# Patient Record
Sex: MALE | Race: WHITE | NOT HISPANIC OR LATINO | Employment: OTHER | URBAN - METROPOLITAN AREA
[De-identification: names, ages, dates, MRNs, and addresses within clinical notes are randomized per-mention and may not be internally consistent; named-entity substitution may affect disease eponyms.]

---

## 2018-05-22 ENCOUNTER — HOSPITAL ENCOUNTER (EMERGENCY)
Facility: HOSPITAL | Age: 68
Discharge: HOME | End: 2018-05-22
Attending: EMERGENCY MEDICINE | Admitting: EMERGENCY MEDICINE

## 2018-05-22 ENCOUNTER — APPOINTMENT (OUTPATIENT)
Dept: GENERAL RADIOLOGY | Facility: HOSPITAL | Age: 68
End: 2018-05-22

## 2018-05-22 VITALS
HEART RATE: 87 BPM | HEIGHT: 71 IN | SYSTOLIC BLOOD PRESSURE: 157 MMHG | OXYGEN SATURATION: 96 % | BODY MASS INDEX: 34.58 KG/M2 | TEMPERATURE: 98.1 F | WEIGHT: 247 LBS | DIASTOLIC BLOOD PRESSURE: 100 MMHG | RESPIRATION RATE: 18 BRPM

## 2018-05-22 DIAGNOSIS — J44.1 COPD WITH ACUTE EXACERBATION (HCC): ICD-10-CM

## 2018-05-22 DIAGNOSIS — J20.9 BRONCHITIS, ACUTE, WITH BRONCHOSPASM: Primary | ICD-10-CM

## 2018-05-22 LAB
ALBUMIN SERPL-MCNC: 4.4 G/DL (ref 3.2–4.8)
ALBUMIN/GLOB SERPL: 1.7 G/DL (ref 1.5–2.5)
ALP SERPL-CCNC: 88 U/L (ref 25–100)
ALT SERPL W P-5'-P-CCNC: 20 U/L (ref 7–40)
ANION GAP SERPL CALCULATED.3IONS-SCNC: 3 MMOL/L (ref 3–11)
AST SERPL-CCNC: 13 U/L (ref 0–33)
BASOPHILS # BLD AUTO: 0.02 10*3/MM3 (ref 0–0.2)
BASOPHILS NFR BLD AUTO: 0.2 % (ref 0–1)
BILIRUB SERPL-MCNC: 0.5 MG/DL (ref 0.3–1.2)
BNP SERPL-MCNC: 31 PG/ML (ref 0–100)
BUN BLD-MCNC: 13 MG/DL (ref 9–23)
BUN/CREAT SERPL: 11.8 (ref 7–25)
CALCIUM SPEC-SCNC: 9.3 MG/DL (ref 8.7–10.4)
CHLORIDE SERPL-SCNC: 108 MMOL/L (ref 99–109)
CO2 SERPL-SCNC: 30 MMOL/L (ref 20–31)
CREAT BLD-MCNC: 1.1 MG/DL (ref 0.6–1.3)
D-LACTATE SERPL-SCNC: 1.2 MMOL/L (ref 0.5–2)
DEPRECATED RDW RBC AUTO: 51.3 FL (ref 37–54)
EOSINOPHIL # BLD AUTO: 0.27 10*3/MM3 (ref 0–0.3)
EOSINOPHIL NFR BLD AUTO: 2.3 % (ref 0–3)
ERYTHROCYTE [DISTWIDTH] IN BLOOD BY AUTOMATED COUNT: 15.2 % (ref 11.3–14.5)
GFR SERPL CREATININE-BSD FRML MDRD: 67 ML/MIN/1.73
GLOBULIN UR ELPH-MCNC: 2.6 GM/DL
GLUCOSE BLD-MCNC: 110 MG/DL (ref 70–100)
HCT VFR BLD AUTO: 47.6 % (ref 38.9–50.9)
HGB BLD-MCNC: 15.7 G/DL (ref 13.1–17.5)
HOLD SPECIMEN: NORMAL
HOLD SPECIMEN: NORMAL
IMM GRANULOCYTES # BLD: 0.05 10*3/MM3 (ref 0–0.03)
IMM GRANULOCYTES NFR BLD: 0.4 % (ref 0–0.6)
LYMPHOCYTES # BLD AUTO: 1.68 10*3/MM3 (ref 0.6–4.8)
LYMPHOCYTES NFR BLD AUTO: 14.5 % (ref 24–44)
MCH RBC QN AUTO: 30.4 PG (ref 27–31)
MCHC RBC AUTO-ENTMCNC: 33 G/DL (ref 32–36)
MCV RBC AUTO: 92.2 FL (ref 80–99)
MONOCYTES # BLD AUTO: 1.23 10*3/MM3 (ref 0–1)
MONOCYTES NFR BLD AUTO: 10.6 % (ref 0–12)
NEUTROPHILS # BLD AUTO: 8.39 10*3/MM3 (ref 1.5–8.3)
NEUTROPHILS NFR BLD AUTO: 72.4 % (ref 41–71)
PLATELET # BLD AUTO: 289 10*3/MM3 (ref 150–450)
PMV BLD AUTO: 11.1 FL (ref 6–12)
POTASSIUM BLD-SCNC: 4.4 MMOL/L (ref 3.5–5.5)
PROT SERPL-MCNC: 7 G/DL (ref 5.7–8.2)
RBC # BLD AUTO: 5.16 10*6/MM3 (ref 4.2–5.76)
SODIUM BLD-SCNC: 141 MMOL/L (ref 132–146)
TROPONIN I SERPL-MCNC: 0 NG/ML (ref 0–0.07)
TROPONIN I SERPL-MCNC: 0 NG/ML (ref 0–0.07)
WBC NRBC COR # BLD: 11.59 10*3/MM3 (ref 3.5–10.8)
WHOLE BLOOD HOLD SPECIMEN: NORMAL
WHOLE BLOOD HOLD SPECIMEN: NORMAL

## 2018-05-22 PROCEDURE — 80053 COMPREHEN METABOLIC PANEL: CPT | Performed by: EMERGENCY MEDICINE

## 2018-05-22 PROCEDURE — 83880 ASSAY OF NATRIURETIC PEPTIDE: CPT | Performed by: EMERGENCY MEDICINE

## 2018-05-22 PROCEDURE — 93005 ELECTROCARDIOGRAM TRACING: CPT | Performed by: EMERGENCY MEDICINE

## 2018-05-22 PROCEDURE — 84484 ASSAY OF TROPONIN QUANT: CPT

## 2018-05-22 PROCEDURE — 96374 THER/PROPH/DIAG INJ IV PUSH: CPT

## 2018-05-22 PROCEDURE — 87040 BLOOD CULTURE FOR BACTERIA: CPT | Performed by: NURSE PRACTITIONER

## 2018-05-22 PROCEDURE — 85025 COMPLETE CBC W/AUTO DIFF WBC: CPT | Performed by: EMERGENCY MEDICINE

## 2018-05-22 PROCEDURE — 94640 AIRWAY INHALATION TREATMENT: CPT

## 2018-05-22 PROCEDURE — 83605 ASSAY OF LACTIC ACID: CPT | Performed by: NURSE PRACTITIONER

## 2018-05-22 PROCEDURE — 96361 HYDRATE IV INFUSION ADD-ON: CPT

## 2018-05-22 PROCEDURE — 36415 COLL VENOUS BLD VENIPUNCTURE: CPT

## 2018-05-22 PROCEDURE — 25010000002 METHYLPREDNISOLONE PER 125 MG: Performed by: NURSE PRACTITIONER

## 2018-05-22 PROCEDURE — 71045 X-RAY EXAM CHEST 1 VIEW: CPT

## 2018-05-22 PROCEDURE — 99284 EMERGENCY DEPT VISIT MOD MDM: CPT

## 2018-05-22 RX ORDER — CEFDINIR 300 MG/1
300 CAPSULE ORAL 2 TIMES DAILY
Qty: 14 CAPSULE | Refills: 0 | Status: SHIPPED | OUTPATIENT
Start: 2018-05-22 | End: 2020-12-03

## 2018-05-22 RX ORDER — ALBUTEROL SULFATE 1.25 MG/3ML
1 SOLUTION RESPIRATORY (INHALATION) EVERY 6 HOURS PRN
Qty: 50 VIAL | Refills: 2 | Status: SHIPPED | OUTPATIENT
Start: 2018-05-22 | End: 2020-12-03 | Stop reason: SDUPTHER

## 2018-05-22 RX ORDER — METHYLPREDNISOLONE SODIUM SUCCINATE 125 MG/2ML
125 INJECTION, POWDER, LYOPHILIZED, FOR SOLUTION INTRAMUSCULAR; INTRAVENOUS ONCE
Status: COMPLETED | OUTPATIENT
Start: 2018-05-22 | End: 2018-05-22

## 2018-05-22 RX ORDER — AZITHROMYCIN 250 MG/1
TABLET, FILM COATED ORAL
Qty: 6 TABLET | Refills: 0 | Status: SHIPPED | OUTPATIENT
Start: 2018-05-22 | End: 2020-12-03

## 2018-05-22 RX ORDER — SODIUM CHLORIDE 0.9 % (FLUSH) 0.9 %
10 SYRINGE (ML) INJECTION AS NEEDED
Status: DISCONTINUED | OUTPATIENT
Start: 2018-05-22 | End: 2018-05-22 | Stop reason: HOSPADM

## 2018-05-22 RX ORDER — METHYLPREDNISOLONE 4 MG/1
TABLET ORAL
Qty: 21 TABLET | Refills: 0 | Status: SHIPPED | OUTPATIENT
Start: 2018-05-22 | End: 2020-12-03

## 2018-05-22 RX ORDER — IPRATROPIUM BROMIDE AND ALBUTEROL SULFATE 2.5; .5 MG/3ML; MG/3ML
3 SOLUTION RESPIRATORY (INHALATION) ONCE
Status: COMPLETED | OUTPATIENT
Start: 2018-05-22 | End: 2018-05-22

## 2018-05-22 RX ADMIN — METHYLPREDNISOLONE SODIUM SUCCINATE 125 MG: 125 INJECTION, POWDER, FOR SOLUTION INTRAMUSCULAR; INTRAVENOUS at 16:49

## 2018-05-22 RX ADMIN — IPRATROPIUM BROMIDE AND ALBUTEROL SULFATE 3 ML: 2.5; .5 SOLUTION RESPIRATORY (INHALATION) at 16:24

## 2018-05-22 RX ADMIN — SODIUM CHLORIDE 1000 ML: 9 INJECTION, SOLUTION INTRAVENOUS at 16:48

## 2018-05-22 NOTE — ED PROVIDER NOTES
"Subjective   [facility] for SOB getting grad worse for five days, producing clear sputum with occcasional chest pain, the last occasion of pain was this morning.  Denies known fever.  Was hospitalized in Fall 2017 in [place] for \"lung infection.\"  Not using oxygen at home.          History provided by:  Patient   used: No    Shortness of Breath   Severity:  Moderate  Onset quality:  Gradual  Duration:  5 days  Timing:  Constant  Progression:  Worsening  Chronicity:  New  Context: URI    Relieved by:  Nothing  Worsened by:  Nothing  Ineffective treatments:  None tried  Associated symptoms: chest pain and cough    Associated symptoms: no abdominal pain, no diaphoresis, no fever, no headaches, no neck pain, no rash, no sore throat, no vomiting and no wheezing    Risk factors: obesity and tobacco use        Review of Systems   Constitutional: Positive for appetite change and fatigue. Negative for diaphoresis and fever.   HENT: Negative for sore throat.         Mentions a right upper toothache.    Eyes: Positive for discharge (both eyes with drainage \"long time\" ). Negative for pain and visual disturbance.   Respiratory: Positive for cough and shortness of breath (with exertion ). Negative for wheezing and stridor.    Cardiovascular: Positive for chest pain. Negative for leg swelling.   Gastrointestinal: Negative.  Negative for abdominal pain, diarrhea, nausea and vomiting.   Endocrine: Negative.         No hx DM     Genitourinary: Negative.  Negative for dysuria.   Musculoskeletal: Negative.  Negative for back pain and neck pain.   Skin: Negative.  Negative for pallor and rash.   Allergic/Immunologic: Negative.    Neurological: Negative.  Negative for syncope and headaches.   Hematological: Negative.    Psychiatric/Behavioral: Negative.  Negative for agitation.       Past Medical History:   Diagnosis Date   • COPD (chronic obstructive pulmonary disease)  " "  • Hypertension        Allergies   Allergen Reactions   • Other Unknown (See Comments)     UNKNOWN \"QUIT SMOKING PATCH\".  AND \"IT SET ME ON FIRE\"       History reviewed. No pertinent surgical history.    History reviewed. No pertinent family history.    Social History     Social History   • Marital status:      Social History Main Topics   • Smoking status: Current Every Day Smoker     Packs/day: 1.00     Types: Cigarettes   • Alcohol use Yes      Comment: SOCIAL   • Drug use: No   • Sexual activity: Defer     Other Topics Concern   • Not on file           Objective   Physical Exam   Constitutional: He is oriented to person, place, and time. He appears well-developed and well-nourished. No distress.   HENT:   Head: Normocephalic.   Mouth/Throat: Oropharynx is clear and moist.   Eyes: EOM are normal. Pupils are equal, round, and reactive to light. Right eye exhibits no discharge. Left eye exhibits no discharge.   Neck: Normal range of motion. Neck supple.   Cardiovascular: Normal rate and regular rhythm.    Pulmonary/Chest: Effort normal. No respiratory distress. He has wheezes. He has rales. He exhibits no tenderness.   Abdominal: Soft. Bowel sounds are normal. He exhibits no distension. There is no tenderness. There is no rebound and no guarding.   Musculoskeletal: Normal range of motion. He exhibits no edema.   Neurological: He is alert and oriented to person, place, and time. No sensory deficit. Coordination normal.   Skin: Skin is warm and dry. Capillary refill takes less than 2 seconds. No rash noted. He is not diaphoretic. No erythema. No pallor.   Psychiatric: He has a normal mood and affect. Thought content normal.   Very poor insight into his health.    Nursing note and vitals reviewed.      Procedures           ED Course      Recent Results (from the past 24 hour(s))   Comprehensive Metabolic Panel    Collection Time: 05/22/18  3:44 PM   Result Value Ref Range    Glucose 110 (H) 70 - 100 mg/dL    " BUN 13 9 - 23 mg/dL    Creatinine 1.10 0.60 - 1.30 mg/dL    Sodium 141 132 - 146 mmol/L    Potassium 4.4 3.5 - 5.5 mmol/L    Chloride 108 99 - 109 mmol/L    CO2 30.0 20.0 - 31.0 mmol/L    Calcium 9.3 8.7 - 10.4 mg/dL    Total Protein 7.0 5.7 - 8.2 g/dL    Albumin 4.40 3.20 - 4.80 g/dL    ALT (SGPT) 20 7 - 40 U/L    AST (SGOT) 13 0 - 33 U/L    Alkaline Phosphatase 88 25 - 100 U/L    Total Bilirubin 0.5 0.3 - 1.2 mg/dL    eGFR Non African Amer 67 >60 mL/min/1.73    Globulin 2.6 gm/dL    A/G Ratio 1.7 1.5 - 2.5 g/dL    BUN/Creatinine Ratio 11.8 7.0 - 25.0    Anion Gap 3.0 3.0 - 11.0 mmol/L   BNP    Collection Time: 05/22/18  3:44 PM   Result Value Ref Range    BNP 31.0 0.0 - 100.0 pg/mL   Light Blue Top    Collection Time: 05/22/18  3:44 PM   Result Value Ref Range    Extra Tube hold for add-on    Green Top (Gel)    Collection Time: 05/22/18  3:44 PM   Result Value Ref Range    Extra Tube Hold for add-ons.    Lavender Top    Collection Time: 05/22/18  3:44 PM   Result Value Ref Range    Extra Tube hold for add-on    Gold Top - SST    Collection Time: 05/22/18  3:44 PM   Result Value Ref Range    Extra Tube Hold for add-ons.    CBC Auto Differential    Collection Time: 05/22/18  3:44 PM   Result Value Ref Range    WBC 11.59 (H) 3.50 - 10.80 10*3/mm3    RBC 5.16 4.20 - 5.76 10*6/mm3    Hemoglobin 15.7 13.1 - 17.5 g/dL    Hematocrit 47.6 38.9 - 50.9 %    MCV 92.2 80.0 - 99.0 fL    MCH 30.4 27.0 - 31.0 pg    MCHC 33.0 32.0 - 36.0 g/dL    RDW 15.2 (H) 11.3 - 14.5 %    RDW-SD 51.3 37.0 - 54.0 fl    MPV 11.1 6.0 - 12.0 fL    Platelets 289 150 - 450 10*3/mm3    Neutrophil % 72.4 (H) 41.0 - 71.0 %    Lymphocyte % 14.5 (L) 24.0 - 44.0 %    Monocyte % 10.6 0.0 - 12.0 %    Eosinophil % 2.3 0.0 - 3.0 %    Basophil % 0.2 0.0 - 1.0 %    Immature Grans % 0.4 0.0 - 0.6 %    Neutrophils, Absolute 8.39 (H) 1.50 - 8.30 10*3/mm3    Lymphocytes, Absolute 1.68 0.60 - 4.80 10*3/mm3    Monocytes, Absolute 1.23 (H) 0.00 - 1.00 10*3/mm3     Eosinophils, Absolute 0.27 0.00 - 0.30 10*3/mm3    Basophils, Absolute 0.02 0.00 - 0.20 10*3/mm3    Immature Grans, Absolute 0.05 (H) 0.00 - 0.03 10*3/mm3   POC Troponin, Rapid    Collection Time: 05/22/18  3:49 PM   Result Value Ref Range    Troponin I 0.00 0.00 - 0.07 ng/mL   Lactic Acid, Plasma    Collection Time: 05/22/18  5:30 PM   Result Value Ref Range    Lactate 1.2 0.5 - 2.0 mmol/L   POC Troponin, Rapid    Collection Time: 05/22/18  6:05 PM   Result Value Ref Range    Troponin I 0.00 0.00 - 0.07 ng/mL     Note: In addition to lab results from this visit, the labs listed above may include labs taken at another facility or during a different encounter within the last 24 hours. Please correlate lab times with ED admission and discharge times for further clarification of the services performed during this visit.    XR Chest 1 View   Final Result   Bibasilar opacifications and probable trace bilateral   pleural effusions concerning for atelectasis and/or early airspace   disease.       D:  05/22/2018   E:  05/22/2018       This report was finalized on 5/22/2018 5:16 PM by Dr. Dilip Wilkerson.            Vitals:    05/22/18 1700 05/22/18 1730 05/22/18 1810 05/22/18 1811   BP: 140/91 141/100 (!) 144/107    Pulse: 81 82  91   Resp:       Temp:       TempSrc:       SpO2: 96% 97%  97%   Weight:       Height:         Medications   sodium chloride 0.9 % flush 10 mL (not administered)   sodium chloride 0.9 % bolus 1,000 mL (1,000 mL Intravenous New Bag 5/22/18 1648)   ipratropium-albuterol (DUO-NEB) nebulizer solution 3 mL (3 mL Nebulization Given 5/22/18 1624)   methylPREDNISolone sodium succinate (SOLU-Medrol) injection 125 mg (125 mg Intravenous Given 5/22/18 1649)     ECG/EMG Results (last 24 hours)     Procedure Component Value Units Date/Time    ECG 12 Lead [[id number]] Collected:  05/22/18 1539     Updated:  05/22/18 1731    ECG 12 Lead [[id number]] Collected:  05/22/18 1759     Updated:  05/22/18 1800                     [facility]      Final diagnoses:   Bronchitis, acute, with bronchospasm   COPD with acute exacerbation            Jo-Ann Marino, APRN  05/22/18 2588

## 2018-05-22 NOTE — DISCHARGE INSTRUCTIONS
MEDICATIONS AS DIRECTED.  FOLLOW UP WITH YOUR PRIMARY CARE PROVIDER IN [facility] TO MONITOR YOUR RECOVERY.  THANK YOU

## 2018-05-27 LAB
BACTERIA SPEC AEROBE CULT: NORMAL
BACTERIA SPEC AEROBE CULT: NORMAL

## 2020-12-03 ENCOUNTER — CONSULT (OUTPATIENT)
Dept: CARDIOLOGY | Facility: CLINIC | Age: 70
End: 2020-12-03

## 2020-12-03 VITALS
BODY MASS INDEX: 32.98 KG/M2 | WEIGHT: 235.6 LBS | SYSTOLIC BLOOD PRESSURE: 131 MMHG | TEMPERATURE: 98 F | HEART RATE: 67 BPM | DIASTOLIC BLOOD PRESSURE: 68 MMHG | OXYGEN SATURATION: 97 % | HEIGHT: 71 IN

## 2020-12-03 DIAGNOSIS — R06.02 SHORTNESS OF BREATH ON EXERTION: Primary | ICD-10-CM

## 2020-12-03 DIAGNOSIS — E66.8 MODERATE OBESITY: ICD-10-CM

## 2020-12-03 DIAGNOSIS — I49.3 FREQUENT PVCS: ICD-10-CM

## 2020-12-03 DIAGNOSIS — I20.0 UNSTABLE ANGINA (HCC): ICD-10-CM

## 2020-12-03 DIAGNOSIS — Z87.891 PERSONAL HISTORY OF NICOTINE DEPENDENCE: ICD-10-CM

## 2020-12-03 DIAGNOSIS — Z72.0 CURRENT TOBACCO USE: ICD-10-CM

## 2020-12-03 PROBLEM — Z12.2 ENCOUNTER FOR SCREENING FOR LUNG CANCER: Status: RESOLVED | Noted: 2020-12-03 | Resolved: 2020-12-03

## 2020-12-03 PROBLEM — Z12.2 ENCOUNTER FOR SCREENING FOR LUNG CANCER: Status: ACTIVE | Noted: 2020-12-03

## 2020-12-03 PROCEDURE — 93000 ELECTROCARDIOGRAM COMPLETE: CPT | Performed by: INTERNAL MEDICINE

## 2020-12-03 PROCEDURE — 99204 OFFICE O/P NEW MOD 45 MIN: CPT | Performed by: INTERNAL MEDICINE

## 2020-12-03 RX ORDER — PANTOPRAZOLE SODIUM 40 MG/1
40 TABLET, DELAYED RELEASE ORAL DAILY
COMMUNITY

## 2020-12-03 RX ORDER — FLUTICASONE FUROATE 200 UG/1
POWDER RESPIRATORY (INHALATION) DAILY
COMMUNITY
Start: 2020-10-26 | End: 2022-05-24 | Stop reason: SDDI

## 2020-12-03 RX ORDER — GABAPENTIN 600 MG/1
TABLET ORAL 3 TIMES DAILY
COMMUNITY
Start: 2020-11-09 | End: 2022-05-24 | Stop reason: SDDI

## 2020-12-03 RX ORDER — IPRATROPIUM BROMIDE AND ALBUTEROL SULFATE 2.5; .5 MG/3ML; MG/3ML
1.5 SOLUTION RESPIRATORY (INHALATION) 4 TIMES DAILY
COMMUNITY
Start: 2020-11-09

## 2020-12-03 RX ORDER — ASPIRIN 325 MG
325 TABLET ORAL DAILY
COMMUNITY
End: 2022-05-24 | Stop reason: SDDI

## 2020-12-03 RX ORDER — NITROGLYCERIN 0.4 MG/1
TABLET SUBLINGUAL
Qty: 100 TABLET | Refills: 11 | Status: SHIPPED | OUTPATIENT
Start: 2020-12-03 | End: 2022-05-24 | Stop reason: SDDI

## 2020-12-03 RX ORDER — DIPHENHYDRAMINE HCL 25 MG
25 CAPSULE ORAL EVERY 6 HOURS PRN
COMMUNITY
End: 2022-05-24 | Stop reason: SDDI

## 2020-12-08 ENCOUNTER — APPOINTMENT (OUTPATIENT)
Dept: PREADMISSION TESTING | Facility: HOSPITAL | Age: 70
End: 2020-12-08

## 2020-12-08 PROCEDURE — C9803 HOPD COVID-19 SPEC COLLECT: HCPCS

## 2020-12-08 PROCEDURE — U0004 COV-19 TEST NON-CDC HGH THRU: HCPCS

## 2020-12-09 LAB — SARS-COV-2 RNA RESP QL NAA+PROBE: NOT DETECTED

## 2020-12-11 ENCOUNTER — HOSPITAL ENCOUNTER (OUTPATIENT)
Facility: HOSPITAL | Age: 70
Setting detail: HOSPITAL OUTPATIENT SURGERY
Discharge: HOME | End: 2020-12-11
Attending: INTERNAL MEDICINE | Admitting: INTERNAL MEDICINE

## 2020-12-11 VITALS
BODY MASS INDEX: 32.34 KG/M2 | TEMPERATURE: 97.8 F | HEIGHT: 71 IN | RESPIRATION RATE: 16 BRPM | DIASTOLIC BLOOD PRESSURE: 79 MMHG | OXYGEN SATURATION: 95 % | WEIGHT: 231 LBS | SYSTOLIC BLOOD PRESSURE: 133 MMHG | HEART RATE: 79 BPM

## 2020-12-11 DIAGNOSIS — Z72.0 CURRENT TOBACCO USE: ICD-10-CM

## 2020-12-11 DIAGNOSIS — I49.3 FREQUENT PVCS: ICD-10-CM

## 2020-12-11 DIAGNOSIS — R06.02 SHORTNESS OF BREATH ON EXERTION: ICD-10-CM

## 2020-12-11 DIAGNOSIS — I20.0 UNSTABLE ANGINA (HCC): ICD-10-CM

## 2020-12-11 LAB
ALBUMIN SERPL-MCNC: 4.6 G/DL (ref 3.5–5.2)
ALBUMIN/GLOB SERPL: 1.8 G/DL
ALP SERPL-CCNC: 106 U/L (ref 39–117)
ALT SERPL W P-5'-P-CCNC: 12 U/L (ref 1–41)
ANION GAP SERPL CALCULATED.3IONS-SCNC: 11 MMOL/L (ref 5–15)
AST SERPL-CCNC: 11 U/L (ref 1–40)
BILIRUB SERPL-MCNC: 0.6 MG/DL (ref 0–1.2)
BUN SERPL-MCNC: 11 MG/DL (ref 8–23)
BUN/CREAT SERPL: 10 (ref 7–25)
CALCIUM SPEC-SCNC: 9.9 MG/DL (ref 8.6–10.5)
CATH EF ESTIMATED: 50 %
CHLORIDE SERPL-SCNC: 102 MMOL/L (ref 98–107)
CHOLEST SERPL-MCNC: 215 MG/DL (ref 0–200)
CO2 SERPL-SCNC: 27 MMOL/L (ref 22–29)
CREAT SERPL-MCNC: 1.1 MG/DL (ref 0.76–1.27)
DEPRECATED RDW RBC AUTO: 49.1 FL (ref 37–54)
ERYTHROCYTE [DISTWIDTH] IN BLOOD BY AUTOMATED COUNT: 14.5 % (ref 12.3–15.4)
GFR SERPL CREATININE-BSD FRML MDRD: 66 ML/MIN/1.73
GLOBULIN UR ELPH-MCNC: 2.5 GM/DL
GLUCOSE SERPL-MCNC: 88 MG/DL (ref 65–99)
HBA1C MFR BLD: 5.6 % (ref 4.8–5.6)
HCT VFR BLD AUTO: 52.2 % (ref 37.5–51)
HDLC SERPL-MCNC: 42 MG/DL (ref 40–60)
HGB BLD-MCNC: 17 G/DL (ref 13–17.7)
LDLC SERPL CALC-MCNC: 147 MG/DL (ref 0–100)
LDLC/HDLC SERPL: 3.45 {RATIO}
MCH RBC QN AUTO: 29.9 PG (ref 26.6–33)
MCHC RBC AUTO-ENTMCNC: 32.6 G/DL (ref 31.5–35.7)
MCV RBC AUTO: 91.9 FL (ref 79–97)
PLATELET # BLD AUTO: 292 10*3/MM3 (ref 140–450)
PMV BLD AUTO: 10.6 FL (ref 6–12)
POTASSIUM SERPL-SCNC: 4.5 MMOL/L (ref 3.5–5.2)
PROT SERPL-MCNC: 7.1 G/DL (ref 6–8.5)
RBC # BLD AUTO: 5.68 10*6/MM3 (ref 4.14–5.8)
SODIUM SERPL-SCNC: 140 MMOL/L (ref 136–145)
TRIGL SERPL-MCNC: 141 MG/DL (ref 0–150)
VLDLC SERPL-MCNC: 26 MG/DL (ref 5–40)
WBC # BLD AUTO: 9.21 10*3/MM3 (ref 3.4–10.8)

## 2020-12-11 PROCEDURE — 80053 COMPREHEN METABOLIC PANEL: CPT | Performed by: NURSE PRACTITIONER

## 2020-12-11 PROCEDURE — 99153 MOD SED SAME PHYS/QHP EA: CPT

## 2020-12-11 PROCEDURE — 99152 MOD SED SAME PHYS/QHP 5/>YRS: CPT

## 2020-12-11 PROCEDURE — C1894 INTRO/SHEATH, NON-LASER: HCPCS | Performed by: INTERNAL MEDICINE

## 2020-12-11 PROCEDURE — 25010000002 MIDAZOLAM PER 1 MG: Performed by: INTERNAL MEDICINE

## 2020-12-11 PROCEDURE — 93458 L HRT ARTERY/VENTRICLE ANGIO: CPT | Performed by: INTERNAL MEDICINE

## 2020-12-11 PROCEDURE — 25010000002 FENTANYL CITRATE (PF) 100 MCG/2ML SOLUTION: Performed by: INTERNAL MEDICINE

## 2020-12-11 PROCEDURE — 83036 HEMOGLOBIN GLYCOSYLATED A1C: CPT | Performed by: NURSE PRACTITIONER

## 2020-12-11 PROCEDURE — 80061 LIPID PANEL: CPT | Performed by: NURSE PRACTITIONER

## 2020-12-11 PROCEDURE — C1769 GUIDE WIRE: HCPCS | Performed by: INTERNAL MEDICINE

## 2020-12-11 PROCEDURE — 0 IOPAMIDOL PER 1 ML: Performed by: INTERNAL MEDICINE

## 2020-12-11 PROCEDURE — 85027 COMPLETE CBC AUTOMATED: CPT | Performed by: NURSE PRACTITIONER

## 2020-12-11 RX ORDER — NITROGLYCERIN 0.4 MG/1
0.4 TABLET SUBLINGUAL
Status: DISCONTINUED | OUTPATIENT
Start: 2020-12-11 | End: 2020-12-11 | Stop reason: HOSPADM

## 2020-12-11 RX ORDER — SODIUM CHLORIDE 9 MG/ML
1-3 INJECTION, SOLUTION INTRAVENOUS CONTINUOUS
Status: DISCONTINUED | OUTPATIENT
Start: 2020-12-11 | End: 2020-12-11 | Stop reason: HOSPADM

## 2020-12-11 RX ORDER — SODIUM CHLORIDE 0.9 % (FLUSH) 0.9 %
3 SYRINGE (ML) INJECTION EVERY 12 HOURS SCHEDULED
Status: DISCONTINUED | OUTPATIENT
Start: 2020-12-11 | End: 2020-12-11 | Stop reason: HOSPADM

## 2020-12-11 RX ORDER — ASPIRIN 325 MG
325 TABLET, DELAYED RELEASE (ENTERIC COATED) ORAL DAILY
Status: DISCONTINUED | OUTPATIENT
Start: 2020-12-12 | End: 2020-12-11 | Stop reason: HOSPADM

## 2020-12-11 RX ORDER — MIDAZOLAM HYDROCHLORIDE 1 MG/ML
INJECTION INTRAMUSCULAR; INTRAVENOUS AS NEEDED
Status: DISCONTINUED | OUTPATIENT
Start: 2020-12-11 | End: 2020-12-11 | Stop reason: HOSPADM

## 2020-12-11 RX ORDER — SODIUM CHLORIDE 0.9 % (FLUSH) 0.9 %
1-10 SYRINGE (ML) INJECTION AS NEEDED
Status: DISCONTINUED | OUTPATIENT
Start: 2020-12-11 | End: 2020-12-11 | Stop reason: HOSPADM

## 2020-12-11 RX ORDER — FENTANYL CITRATE 50 UG/ML
INJECTION, SOLUTION INTRAMUSCULAR; INTRAVENOUS AS NEEDED
Status: DISCONTINUED | OUTPATIENT
Start: 2020-12-11 | End: 2020-12-11 | Stop reason: HOSPADM

## 2020-12-11 RX ORDER — ATORVASTATIN CALCIUM 20 MG/1
20 TABLET, FILM COATED ORAL DAILY
Qty: 30 TABLET | Refills: 11 | Status: SHIPPED | OUTPATIENT
Start: 2020-12-11 | End: 2022-05-24 | Stop reason: SDDI

## 2020-12-11 RX ORDER — ACETAMINOPHEN 325 MG/1
650 TABLET ORAL EVERY 4 HOURS PRN
Status: DISCONTINUED | OUTPATIENT
Start: 2020-12-11 | End: 2020-12-11 | Stop reason: HOSPADM

## 2020-12-11 RX ORDER — ONDANSETRON 2 MG/ML
4 INJECTION INTRAMUSCULAR; INTRAVENOUS EVERY 6 HOURS PRN
Status: DISCONTINUED | OUTPATIENT
Start: 2020-12-11 | End: 2020-12-11 | Stop reason: HOSPADM

## 2020-12-11 RX ORDER — ASPIRIN 325 MG
325 TABLET ORAL ONCE
Status: COMPLETED | OUTPATIENT
Start: 2020-12-11 | End: 2020-12-11

## 2020-12-11 RX ORDER — LIDOCAINE HYDROCHLORIDE 10 MG/ML
INJECTION, SOLUTION EPIDURAL; INFILTRATION; INTRACAUDAL; PERINEURAL AS NEEDED
Status: DISCONTINUED | OUTPATIENT
Start: 2020-12-11 | End: 2020-12-11 | Stop reason: HOSPADM

## 2020-12-11 RX ADMIN — SODIUM CHLORIDE 3 ML/KG/HR: 9 INJECTION, SOLUTION INTRAVENOUS at 07:30

## 2020-12-11 RX ADMIN — ASPIRIN 325 MG ORAL TABLET 325 MG: 325 PILL ORAL at 07:30

## 2020-12-11 NOTE — INTERVAL H&P NOTE
H&P reviewed. The patient was examined and there are no changes to the H&P.     Patient presents today for [facility] plus/minus CBI. The risks, benefits, and alternatives of the procedure have been reviewed and the patient wishes to proceed.     Proceed as planned.     Electronically signed by ISA Vincent, 12/11/20, 8:42 AM EST.

## 2021-01-25 ENCOUNTER — HOSPITAL ENCOUNTER (OUTPATIENT)
Dept: CARDIOLOGY | Facility: HOSPITAL | Age: 71
Discharge: HOME | End: 2021-01-25
Admitting: NURSE PRACTITIONER

## 2021-01-25 VITALS — WEIGHT: 231 LBS | HEIGHT: 71 IN | BODY MASS INDEX: 32.34 KG/M2

## 2021-01-25 DIAGNOSIS — I20.0 UNSTABLE ANGINA (HCC): ICD-10-CM

## 2021-01-25 DIAGNOSIS — R06.02 SHORTNESS OF BREATH ON EXERTION: ICD-10-CM

## 2021-01-25 DIAGNOSIS — Z72.0 CURRENT TOBACCO USE: ICD-10-CM

## 2021-01-25 DIAGNOSIS — I49.3 FREQUENT PVCS: ICD-10-CM

## 2021-01-25 LAB
BH CV ECHO MEAS - AO ROOT AREA (BSA CORRECTED): 1.7
BH CV ECHO MEAS - AO ROOT AREA: 11.3 CM^2
BH CV ECHO MEAS - AO ROOT DIAM: 3.8 CM
BH CV ECHO MEAS - BSA(HAYCOCK): 2.3 M^2
BH CV ECHO MEAS - BSA: 2.2 M^2
BH CV ECHO MEAS - BZI_BMI: 32.3 KILOGRAMS/M^2
BH CV ECHO MEAS - BZI_METRIC_HEIGHT: 180 CM
BH CV ECHO MEAS - BZI_METRIC_WEIGHT: 104.8 KG
BH CV ECHO MEAS - EDV(CUBED): 195.1 ML
BH CV ECHO MEAS - EDV(MOD-SP2): 63.2 ML
BH CV ECHO MEAS - EDV(MOD-SP4): 171 ML
BH CV ECHO MEAS - EDV(TEICH): 166.6 ML
BH CV ECHO MEAS - EF(CUBED): 67.2 %
BH CV ECHO MEAS - EF(MOD-BP): 56 %
BH CV ECHO MEAS - EF(MOD-SP2): 35.3 %
BH CV ECHO MEAS - EF(MOD-SP4): 56.1 %
BH CV ECHO MEAS - EF(TEICH): 58 %
BH CV ECHO MEAS - ESV(CUBED): 64 ML
BH CV ECHO MEAS - ESV(MOD-SP2): 40.9 ML
BH CV ECHO MEAS - ESV(MOD-SP4): 75 ML
BH CV ECHO MEAS - ESV(TEICH): 70 ML
BH CV ECHO MEAS - FS: 31 %
BH CV ECHO MEAS - IVS/LVPW: 1
BH CV ECHO MEAS - IVSD: 1 CM
BH CV ECHO MEAS - LA DIMENSION: 3.9 CM
BH CV ECHO MEAS - LA/AO: 1
BH CV ECHO MEAS - LAD MAJOR: 7.1 CM
BH CV ECHO MEAS - LAT PEAK E' VEL: 9.1 CM/SEC
BH CV ECHO MEAS - LATERAL E/E' RATIO: 9.2
BH CV ECHO MEAS - LV DIASTOLIC VOL/BSA (35-75): 76.4 ML/M^2
BH CV ECHO MEAS - LV MASS(C)D: 233.1 GRAMS
BH CV ECHO MEAS - LV MASS(C)DI: 104.1 GRAMS/M^2
BH CV ECHO MEAS - LV MAX PG: 4.1 MMHG
BH CV ECHO MEAS - LV MEAN PG: 2 MMHG
BH CV ECHO MEAS - LV SYSTOLIC VOL/BSA (12-30): 33.5 ML/M^2
BH CV ECHO MEAS - LV V1 MAX: 101 CM/SEC
BH CV ECHO MEAS - LV V1 MEAN: 62.8 CM/SEC
BH CV ECHO MEAS - LV V1 VTI: 21.4 CM
BH CV ECHO MEAS - LVIDD: 5.8 CM
BH CV ECHO MEAS - LVIDS: 4 CM
BH CV ECHO MEAS - LVLD AP2: 7.5 CM
BH CV ECHO MEAS - LVLD AP4: 8.9 CM
BH CV ECHO MEAS - LVLS AP2: 7.6 CM
BH CV ECHO MEAS - LVLS AP4: 7.5 CM
BH CV ECHO MEAS - LVOT AREA (M): 3.1 CM^2
BH CV ECHO MEAS - LVOT AREA: 3.1 CM^2
BH CV ECHO MEAS - LVOT DIAM: 2 CM
BH CV ECHO MEAS - LVPWD: 1 CM
BH CV ECHO MEAS - MED PEAK E' VEL: 8.2 CM/SEC
BH CV ECHO MEAS - MEDIAL E/E' RATIO: 10.4
BH CV ECHO MEAS - MV A MAX VEL: 116 CM/SEC
BH CV ECHO MEAS - MV DEC SLOPE: 470.5 CM/SEC^2
BH CV ECHO MEAS - MV DEC TIME: 0.23 SEC
BH CV ECHO MEAS - MV E MAX VEL: 84.5 CM/SEC
BH CV ECHO MEAS - MV E/A: 0.73
BH CV ECHO MEAS - MV MAX PG: 6.3 MMHG
BH CV ECHO MEAS - MV MEAN PG: 3 MMHG
BH CV ECHO MEAS - MV P1/2T MAX VEL: 95 CM/SEC
BH CV ECHO MEAS - MV P1/2T: 59.1 MSEC
BH CV ECHO MEAS - MV V2 MAX: 125 CM/SEC
BH CV ECHO MEAS - MV V2 MEAN: 74.8 CM/SEC
BH CV ECHO MEAS - MV V2 VTI: 29.6 CM
BH CV ECHO MEAS - MVA P1/2T LCG: 2.3 CM^2
BH CV ECHO MEAS - MVA(P1/2T): 3.7 CM^2
BH CV ECHO MEAS - MVA(VTI): 2.3 CM^2
BH CV ECHO MEAS - PA ACC TIME: 0.12 SEC
BH CV ECHO MEAS - PA PR(ACCEL): 23.7 MMHG
BH CV ECHO MEAS - SI(CUBED): 58.6 ML/M^2
BH CV ECHO MEAS - SI(LVOT): 30 ML/M^2
BH CV ECHO MEAS - SI(MOD-SP2): 10 ML/M^2
BH CV ECHO MEAS - SI(MOD-SP4): 42.9 ML/M^2
BH CV ECHO MEAS - SI(TEICH): 43.1 ML/M^2
BH CV ECHO MEAS - SV(CUBED): 131.1 ML
BH CV ECHO MEAS - SV(LVOT): 67.2 ML
BH CV ECHO MEAS - SV(MOD-SP2): 22.3 ML
BH CV ECHO MEAS - SV(MOD-SP4): 96 ML
BH CV ECHO MEAS - SV(TEICH): 96.6 ML
BH CV ECHO MEAS - TAPSE (>1.6): 2.2 CM
BH CV ECHO MEASUREMENTS AVERAGE E/E' RATIO: 9.77
BH CV VAS BP LEFT ARM: NORMAL MMHG
BH CV XLRA - TDI S': 15 CM/SEC
LV EF 2D ECHO EST: 58 %
MAXIMAL PREDICTED HEART RATE: 150 BPM
STRESS TARGET HR: 128 BPM

## 2021-01-25 PROCEDURE — 93306 TTE W/DOPPLER COMPLETE: CPT | Performed by: INTERNAL MEDICINE

## 2021-01-25 PROCEDURE — 93306 TTE W/DOPPLER COMPLETE: CPT

## 2021-02-22 ENCOUNTER — APPOINTMENT (OUTPATIENT)
Dept: PREADMISSION TESTING | Facility: HOSPITAL | Age: 71
End: 2021-02-22

## 2021-02-22 PROCEDURE — U0004 COV-19 TEST NON-CDC HGH THRU: HCPCS

## 2021-02-22 PROCEDURE — C9803 HOPD COVID-19 SPEC COLLECT: HCPCS

## 2021-02-23 LAB — SARS-COV-2 RNA RESP QL NAA+PROBE: NOT DETECTED

## 2021-02-25 ENCOUNTER — OUTSIDE FACILITY SERVICE (OUTPATIENT)
Dept: GASTROENTEROLOGY | Facility: CLINIC | Age: 71
End: 2021-02-25

## 2021-02-25 PROCEDURE — 43239 EGD BIOPSY SINGLE/MULTIPLE: CPT | Performed by: INTERNAL MEDICINE

## 2022-02-07 PROBLEM — E78.5 HYPERLIPIDEMIA LDL GOAL <100: Status: ACTIVE | Noted: 2022-02-07

## 2022-02-07 PROBLEM — R42 DIZZINESS: Status: ACTIVE | Noted: 2022-02-07

## 2022-02-07 PROBLEM — Z91.89 AT RISK FOR SLEEP APNEA: Status: ACTIVE | Noted: 2022-02-07

## 2022-02-07 NOTE — PROGRESS NOTES
Subjective:     Encounter Date:02/10/2022      Patient ID: Milan Real is a 71 y.o.  white male from [place], [place], retired from [employer] where he worked in the [employer] room.      REFERRING PROVIDER: ISA Lindsay  PULMONOLOGIST: David Doshi MD.  ANGIOGRAPHER: Glynn Hubbard MD    Chief Complaint:   Chief Complaint   Patient presents with   • Shortness of Breath   • Slow Heart Rate     Problem List:  1. Shortness of breath on exertion  a. Remote stress test over 20 years ago; negative per patient-data deficit  b. Abnormal ECG with frequent PVCs in a pattern of bigeminy November 2020  c. CCS class III angina/NYHA class III dyspnea on exertion symptoms with abnormal electrocardiogram   d. Left heart catheterization 12/11/2020: LVEF 50-55%, mild nonflow limiting CAD  e. Echocardiogram 1/25/2021: LVEF 58%, LV mildly dilated, LV diastolic function consistent with grade 1 impaired relaxation, RVSP less than 35 mmHg, normal RV cavity size, wall thickness, systolic function and septal motion noted.  Aortic valve exhibits mild sclerosis  f. CCS class 0 angina/NYHA class II-III dyspnea on exertion symptoms in the setting of COPD  2. Current tobacco use; 1 pack/day x 55 years, intolerant to nicotine patches and Chantix, cigarette cessation February 2022, using nicotine pouches  3. Frequent PVCs  4. COPD with 2 L O2 NC hs  5. GERD  6. Moderate obesity: BMI 33.45 February 2022  7. At risk for sleep apnea with daytime sleepiness with apparent abnormal outpatient sleep oximetry and upcoming formal sleep study in [place] February/March 2022  8. Right-sided sciatica  9. Dizziness/presyncope  10. History of colitis spring 2020  11. LAFB on ECG January 2022, subjective bradycardia in the 20's-30's on pulse oximeter at home, has pending 4 day Holter monitor at [facility]  12. Surgical history: Hernia repair as a child    Allergies   Allergen  "Reactions   • Other Unknown (See Comments)     UNKNOWN \"QUIT SMOKING PATCH\".  AND \"IT SET ME ON FIRE\"       Current Outpatient Medications   Medication Instructions   • ALBUTEROL SULFATE IN Inhalation, As Needed   • Anoro Ellipta 62.5-25 MCG/INH aerosol powder  inhaler Daily   • Arnuity Ellipta 200 MCG/ACT aerosol powder  Daily   • aspirin 325 mg, Oral, Daily   • atorvastatin (LIPITOR) 20 mg, Oral, Daily   • Budeson-Glycopyrrol-Formoterol (Breztri Aerosphere) 160-9-4.8 MCG/ACT aerosol inhaler 1 puff, Oral, Daily   • buPROPion SR (WELLBUTRIN SR) 150 mg, Oral   • cetirizine (ZYRTEC) 10 mg, Oral, Daily   • diphenhydrAMINE (ALLERGY RELIEF) 25 mg, Oral, Every 6 Hours PRN   • gabapentin (NEURONTIN) 600 MG tablet 3 Times Daily   • ibuprofen (ADVIL,MOTRIN) 400 MG tablet No dose, route, or frequency recorded.   • ipratropium-albuterol (DUO-NEB) 0.5-2.5 mg/3 ml nebulizer 4 Times Daily   • nitroglycerin (NITROSTAT) 0.4 MG SL tablet 1 under the tongue as needed for angina, may repeat q5mins for up three doses   • pantoprazole (PROTONIX) 40 mg, Oral, Daily         HISTORY OF PRESENT ILLNESS:  The patient is here after a 14-month hiatus.  In the interim the patient had a heart catheterization December 2020 which showed EF 50-55% and mild nonflow limiting coronary artery disease.  Echocardiogram January 2021 showed EF 58%, RVSP less than 35 mmHg, LV diastolic function consistent with grade 1 impaired relaxation.  The patient canceled his June 2021 appointment and January 2022 appointment.  He recently saw his healthcare provider January 2022 for dizziness and bradycardia.  The patient is no longer smoking cigarettes but is using [employer] nicotine pouches.  He has not had any cigarettes in 7-8 days now.  He had an ECG in his primary care physician's office and his heart rate was 92 bpm, sinus rhythm with premature supraventricular complexes and left axis deviation consistent with left anterior fascicular block.  He wore a 4-day Holter " monitor recently and turned it in 3 days ago at [facility].  He is unsure of the results.  He has episodes where he becomes fatigued and dizzy.  His heart rate will drop and he feels that he may pass out but denies syncopal episodes.  Sometimes he will check his heart rate with a pulse oximeter and his heart rate has been around 25-30 bpm occasionally.  It also sometimes be a little over 100 bpm.  His oxygen saturation has been around 91-92%.  He denies any chest pressure or heart fluttering.  He has some shortness of breath on exertion but no angina.  He is not on any AV chica blocking agents.  Multiple relatives in his family have had pacemakers.  He uses 2 L O2 NC at night and thinks that he had a recent OP sleep oximetry study with his pulmonologist and was supposed to go back for a formal sleep study in [place] but this was never performed due to the snowstorm.  Patient has not had any hospitalizations or surgeries since he was last seen in office.  He had a walking oximetry study with his pulmonologist and his oxygen saturation stayed 94% on room air but could only walk about 400 feet without becoming tired and stopping.  He got a DuoNeb treatment in the clinic which helped.  He is not using oxygen during the day.  He was scheduled to have PFTs in November 2021 but was unable to complete this due to having COPD exacerbation. He has an upcoming appointment in March 2022 with Dr. Doshi.    Review of Systems   Cardiovascular: Positive for near-syncope.        Subjective bradycardia   Respiratory: Positive for shortness of breath.    Neurological: Positive for dizziness.      All other systems reviewed and otherwise negative.    Procedures       Objective:       Vitals:    02/10/22 1501 02/10/22 1502   BP: 164/82 144/84   BP Location: Left arm Left arm   Patient Position: Sitting Standing   Pulse: 77 67   SpO2: 98% 99%   Weight: 109 kg (239 lb 12.8 oz) 109 kg (239 lb 12.8 oz)   Height:  "180.3 cm (71\") 180.3 cm (71\")   Recheck blood pressure right arm sitting was 124/60  Body mass index is 33.45 kg/m².  Last weight December 2020 was 235 pounds  Constitutional:       Appearance: Healthy appearance. Not in distress.   Neck:      Vascular: No JVR. JVD normal.   Pulmonary:      Effort: Pulmonary effort is normal.      Breath sounds: Decreased breath sounds present. No wheezing. No rhonchi. No rales.   Chest:      Chest wall: Not tender to palpatation.   Cardiovascular:      PMI at left midclavicular line. Normal rate. Regular rhythm. Normal S1. Normal S2.      Murmurs: There is a grade 1/6 systolic murmur at the LLSB.      No gallop. No click. No rub.   Pulses:     Dorsalis pedis: 1+ bilaterally.     Posterior tibial: 1+ bilaterally.  Edema:     Peripheral edema absent.   Abdominal:      General: Bowel sounds are normal.      Palpations: Abdomen is soft.      Tenderness: There is no abdominal tenderness.   Musculoskeletal: Normal range of motion.         General: No tenderness. Skin:     General: Skin is warm and dry.   Neurological:      General: No focal deficit present.      Mental Status: Alert and oriented to person, place and time.           Lab Review:   Lab Results   Component Value Date    GLUCOSE 88 12/11/2020    BUN 11 12/11/2020    CREATININE 1.10 12/11/2020    EGFRIFNONA 66 12/11/2020    BCR 10.0 12/11/2020    CO2 27.0 12/11/2020    CALCIUM 9.9 12/11/2020    ALBUMIN 4.60 12/11/2020    AST 11 12/11/2020    ALT 12 12/11/2020       Lab Results   Component Value Date    WBC 9.21 12/11/2020    HGB 17.0 12/11/2020    HCT 52.2 (H) 12/11/2020    MCV 91.9 12/11/2020     12/11/2020       Lab Results   Component Value Date    HGBA1C 5.60 12/11/2020         Lab Results   Component Value Date    CHOL 215 (H) 12/11/2020     Lab Results   Component Value Date    TRIG 141 12/11/2020     Lab Results   Component Value Date    HDL 42 12/11/2020     Lab Results   Component Value Date     (H) " 12/11/2020           Lab Results   Component Value Date    BNP 31.0 05/22/2018             Assessment:     Patient with dizziness and episodes of subjective bradycardia with rates in the 20s-30s at home.  He had a recent 4-day Holter monitor but results were not available to review.  I will have him wear a mobile cardiac outpatient telemetry monitor to assess for PAF/SSS, arrhythmias, or pauses.  I will continue to try to obtain his 4-day Holter monitor results.  Concern for possible sick sinus syndrome or significant bradycardia/pauses requiring a pacemaker.  He has an upcoming formal sleep study and appointment March 2022 with his pulmonologist.  He has stopped smoking cigarettes and is currently using nicotine pouches.I advised the patient of the risks of continuing to use tobacco, and I provided this patient with smoking cessation educational materials and discussed how to quit smoking and patient has expressed the willingness to quit.  Counseled patient for 3-5 minutes. Defer AV chica blocking agents.      Diagnosis Plan   1. Shortness of breath on exertion   Heart catheterization December 2020 with EF 50-55%, mild nonflow limiting CAD, No recurrent angina pectoris or CHF on current activity schedule; continue current treatment   2. Frequent PVCs  MCOT   3. Moderate obesity   Physical activity as tolerated, heart healthy diet   4. Current tobacco use  Tobacco cessation form cigarettes, currently using nicotine pouches   5. Dizziness  MCOT, try to obtain 4 day Holter monitor results   6. Hyperlipidemia LDL goal <100  No new labs to review, continue atorvastatin   7. At risk for sleep apnea  Follow up with pulmonologist for sleep study          Plan:         1. Patient to continue current medications and close follow up with the above providers.  2. Tentative cardiology follow up in June 2022 or patient may return sooner PRN.  3. MCOT   4. Follow-up with pulmonologist for sleep study  5. Defer AV chica agents at  this time  6. Try to obtain 4-day Holter monitor results      Electronically signed by ISA Alfonso, 02/10/22, 4:42 PM EST.

## 2022-02-10 ENCOUNTER — OFFICE VISIT (OUTPATIENT)
Dept: CARDIOLOGY | Facility: CLINIC | Age: 72
End: 2022-02-10

## 2022-02-10 VITALS
SYSTOLIC BLOOD PRESSURE: 144 MMHG | BODY MASS INDEX: 33.57 KG/M2 | HEART RATE: 67 BPM | HEIGHT: 71 IN | OXYGEN SATURATION: 99 % | DIASTOLIC BLOOD PRESSURE: 84 MMHG | WEIGHT: 239.8 LBS

## 2022-02-10 DIAGNOSIS — I44.4 LAFB (LEFT ANTERIOR FASCICULAR BLOCK): ICD-10-CM

## 2022-02-10 DIAGNOSIS — R06.02 SHORTNESS OF BREATH ON EXERTION: Primary | ICD-10-CM

## 2022-02-10 DIAGNOSIS — R42 DIZZINESS: ICD-10-CM

## 2022-02-10 DIAGNOSIS — E78.5 HYPERLIPIDEMIA LDL GOAL <100: ICD-10-CM

## 2022-02-10 DIAGNOSIS — Z72.0 CURRENT TOBACCO USE: ICD-10-CM

## 2022-02-10 DIAGNOSIS — Z91.89 AT RISK FOR SLEEP APNEA: ICD-10-CM

## 2022-02-10 DIAGNOSIS — I49.3 FREQUENT PVCS: ICD-10-CM

## 2022-02-10 DIAGNOSIS — E66.8 MODERATE OBESITY: ICD-10-CM

## 2022-02-10 PROCEDURE — 99214 OFFICE O/P EST MOD 30 MIN: CPT | Performed by: NURSE PRACTITIONER

## 2022-02-10 RX ORDER — BUDESONIDE, GLYCOPYRROLATE, AND FORMOTEROL FUMARATE 160; 9; 4.8 UG/1; UG/1; UG/1
1 AEROSOL, METERED RESPIRATORY (INHALATION) DAILY
COMMUNITY
Start: 2021-12-09

## 2022-02-10 RX ORDER — BUPROPION HYDROCHLORIDE 150 MG/1
150 TABLET, EXTENDED RELEASE ORAL
COMMUNITY
Start: 2021-10-05 | End: 2022-05-24 | Stop reason: SDDI

## 2022-02-10 RX ORDER — IBUPROFEN 400 MG/1
TABLET ORAL
COMMUNITY
Start: 2021-08-30 | End: 2022-05-24 | Stop reason: SDDI

## 2022-02-10 RX ORDER — CETIRIZINE HYDROCHLORIDE 10 MG/1
10 TABLET ORAL DAILY
COMMUNITY

## 2022-03-09 ENCOUNTER — DOCUMENTATION (OUTPATIENT)
Dept: CARDIOLOGY | Facility: CLINIC | Age: 72
End: 2022-03-09

## 2022-03-09 NOTE — PROGRESS NOTES
3-day Holter monitor 1/26/2022-1/29/2022 [facility]:  10% PVC burden, 6% PAC burden, no pauses or AV block, no atrial fibrillation or SVT.  Patient had a 10 beat run of nonsustained V. tach and a 5 beat run of SVT.

## 2022-04-15 ENCOUNTER — DOCUMENTATION (OUTPATIENT)
Dept: CARDIOLOGY | Facility: CLINIC | Age: 72
End: 2022-04-15

## 2022-04-15 DIAGNOSIS — I49.3 PVC'S (PREMATURE VENTRICULAR CONTRACTIONS): Primary | ICD-10-CM

## 2022-04-15 NOTE — PROGRESS NOTES
We have tried multiple types of heart monitors to try and get an accurate recording and he has tried 3 monitors with very little data to review. We just got the most recent monitor which had no data to review. He has an upcoming appointment on 6/15/2022.  I attempted to call the patient myself to tell him about the heart monitor results (which had no data) but he did not answer.  I will try to call him again later and recommend that he get a repeat ECG with his PCP faxed to our office to compare to our last ECG and see if he is having any symptoms.    Electronically signed by ISA Alfonso, 04/15/22, 2:19 PM EDT.

## 2022-04-18 ENCOUNTER — TELEPHONE (OUTPATIENT)
Dept: CARDIOLOGY | Facility: CLINIC | Age: 72
End: 2022-04-18

## 2022-04-28 ENCOUNTER — TELEPHONE (OUTPATIENT)
Dept: CARDIOLOGY | Facility: CLINIC | Age: 72
End: 2022-04-28

## 2022-04-28 DIAGNOSIS — I49.3 FREQUENT PVCS: Primary | ICD-10-CM

## 2022-04-28 NOTE — TELEPHONE ENCOUNTER
Martita Garcia APRN Hurley, Mary Katheri, RN  Caller: Unspecified (Today,  3:40 PM)  Please send to EP for 10% PVC burden; needs appt sooner rather than later. May need loop recorder since we can't get monitors to work. I think his symptoms are prob due to his high PVC burden         Called pt and gave ISA Miguel recommendations above. Pt verbalizes understanding and agreeable to plan.

## 2022-04-28 NOTE — TELEPHONE ENCOUNTER
Patient called, states that he has dizziness, feels weak and tired all the time.     HR 22-mid 30s on pulse ox monitor. Pt does not have BP monitor at home.     Per Martita Garcia note 3/9/22:  3-day Holter monitor 1/26/2022-1/29/2022 [facility]:  10% PVC burden, 6% PAC burden, no pauses or AV block, no atrial fibrillation or SVT.  Patient had a 10 beat run of nonsustained V. tach and a 5 beat run of SVT.    Patient has tried to wear a MCOT well as a 14-day event monitor and no data was collected on either monitor.     Pt denies chest pain, SOB, palpitations, swelling.     Please advise.

## 2022-05-23 NOTE — PROGRESS NOTES
Cardiac Electrophysiology Outpatient Note  [facility] at [facility]    Office Visit     Milan Real  [id number]  05/24/2022    Primary Care Physician: Anais Llamas APRN    Referred By: No ref. provider found    CC: PVCs, dizziness    Problem List:  1. PVCs  1. 3-day Holter monitor 1/26/2022-1/29/2022 [facility]: 10% PVC burden, 6% PAC burden, no pauses or AV block, no atrial fibrillation or SVT.  Patient had a 10 beat run of nonsustained V. tach and a 5 beat run of SVT.  2. Reportedly attempted MCOT and 14-day event monitor and no data collected on either monitor  2. Shortness of breath on exertion  a. Remote stress test over 20 years ago; negative per patient-data deficit  b. Abnormal ECG with frequent PVCs in a pattern of bigeminy November 2020  c. CCS class III angina/NYHA class III dyspnea on exertion symptoms with abnormal electrocardiogram   d. Left heart catheterization 12/11/2020: LVEF 50-55%, mild nonflow limiting CAD  e. Echocardiogram 1/25/2021: LVEF 58%, LV mildly dilated, LV diastolic function consistent with grade 1 impaired relaxation, RVSP less than 35 mmHg, normal RV cavity size, wall thickness, systolic function and septal motion noted.  Aortic valve exhibits mild sclerosis  f. CCS class 0 angina/NYHA class II-III dyspnea on exertion symptoms in the setting of COPD  3. Current tobacco use; 1 pack/day x 55 years, intolerant to nicotine patches and Chantix, cigarette cessation February 2022, using nicotine patches  4. COPD with 2 L O2 NC hs  5. GERD  6. Moderate obesity: BMI 33.45 February 2022  7. At risk for sleep apnea with daytime sleepiness with apparent abnormal outpatient sleep oximetry. Per pt, sleep study was postponed d/t pending heart workup, has not been rescheduled  8. Right-sided sciatica  9. Dizziness/presyncope  10. History of colitis spring 2020  11. 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 Medical noncompliance  13. Surgical history: Hernia repair as a child    History of Present Illness:   Milan Real is a 71 y.o. male who presents to the electrophysiology clinic for consultation regarding PVCs, requested by ISA Miguel.  He was last seen in February 2022 with complaints of dizziness and an episode of bradycardia with rates reportedly in the 20s and 30s at home on his pulse ox.  MCOT was ordered at that time but unfortunately did not gather any data.  Results from 3-day monitor with [facility] revealed a 10% PVC C burden, 6% PAC burden, 10 beat run of NSVT and 5 beat run of SVT.  Pt reports dizziness, extreme fatigue, activity intolerance x 3-4 years. Reports syncope in the past, last episode a few years ago, no recent syncope. He decided to seek care after an episode of unsteadiness/dizziness when getting up one morning, did not fall, was able to catch himself against the door. He checks his HR on a pulse ox at home, reports the lowest HR seen is 22 bpm, typically runs 30s.  Reports chest pain that can be R or L sided, occurs with sitting not activity, feels like pressure, lasts ~ 30 seconds. Last episode about 1 month ago, no triggering or relieving factors. Occurs in unpredictable intervals, but not frequently.  Continues to smoke, reports he has anxiety without smoking, now smokes 1 ppd. Reports his father and brother both have had problems with dizziness as well.     Past Surgical History:   Procedure Laterality Date   • CARDIAC CATHETERIZATION N/A 12/11/2020    Procedure: Left Heart Cath;  Surgeon: Glynn Hubbard MD;  Location: [facility] CATH INVASIVE LOC[facility];  Service: Cardiology;  Laterality: N/A;       Family History   Problem Relation Age of Onset   • Heart disease Father        Social History     Socioeconomic History   • Marital status:  "   Tobacco Use   • Smoking status: Current Every Day Smoker     Packs/day: 1.00     Years: 55.00     Pack years: 55.00     Types: Cigarettes   • Smokeless tobacco: Former User   Vaping Use   • Vaping Use: Never used   Substance and Sexual Activity   • Alcohol use: Not Currently     Comment: SOCIAL   • Drug use: No   • Sexual activity: Defer         Current Outpatient Medications:   •  albuterol sulfate  (90 Base) MCG/ACT inhaler, Inhale 2 puffs Every 4 (Four) Hours As Needed for Wheezing., Disp: , Rfl:   •  ALBUTEROL SULFATE IN, Inhale As Needed., Disp: , Rfl:   •  Budeson-Glycopyrrol-Formoterol (Breztri Aerosphere) 160-9-4.8 MCG/ACT aerosol inhaler, Take 1 puff by mouth Daily., Disp: , Rfl:   •  cetirizine (zyrTEC) 10 MG tablet, Take 10 mg by mouth Daily., Disp: , Rfl:   •  ipratropium-albuterol (DUO-NEB) 0.5-2.5 mg/3 ml nebulizer, 4 (Four) Times a Day., Disp: , Rfl:   •  pantoprazole (PROTONIX) 40 MG EC tablet, Take 40 mg by mouth Daily., Disp: , Rfl:     Allergies:   Allergies   Allergen Reactions   • Other Unknown (See Comments)     UNKNOWN \"QUIT SMOKING PATCH\".  AND \"IT SET ME ON FIRE\"       Review of Systems:  Review of Systems   Constitutional: Positive for malaise/fatigue.   Cardiovascular: Positive for chest pain, dyspnea on exertion, near-syncope, palpitations and syncope. Negative for irregular heartbeat, leg swelling, orthopnea and paroxysmal nocturnal dyspnea.   Respiratory: Positive for shortness of breath.         Vital Signs: Blood pressure 150/82, pulse 83, height 180.3 cm (70.98\"), weight 108 kg (239 lb), SpO2 98 %.    Physical Exam  Vitals reviewed.   Cardiovascular:      Rate and Rhythm: Normal rate. Rhythm irregular.      Heart sounds: Normal heart sounds. No murmur heard.  Pulmonary:      Effort: Pulmonary effort is normal.      Comments: Decreased breath sounds  Musculoskeletal:      Right lower leg: No edema.      Left lower leg: No edema.   Neurological:      Mental Status: He " is alert and oriented to person, place, and time.   Psychiatric:         Behavior: Behavior is cooperative.         Lab Results   Component Value Date    GLUCOSE 88 12/11/2020    CALCIUM 9.9 12/11/2020     12/11/2020    K 4.5 12/11/2020    CO2 27.0 12/11/2020     12/11/2020    BUN 11 12/11/2020    CREATININE 1.10 12/11/2020    EGFRIFNONA 66 12/11/2020    BCR 10.0 12/11/2020    ANIONGAP 11.0 12/11/2020     Lab Results   Component Value Date    WBC 9.21 12/11/2020    HGB 17.0 12/11/2020    HCT 52.2 (H) 12/11/2020    MCV 91.9 12/11/2020     12/11/2020     No results found for: INR, PROTIME  No results found for: TSH, D5ZVGYO, E1XQTEJ, THYROIDAB     Results for orders placed during the hospital encounter of 01/25/21    Adult Transthoracic Echo Complete W/ Cont if Necessary Per Protocol    Interpretation Summary  · Estimated left ventricular EF = 58% Estimated left ventricular EF was in agreement with the calculated left ventricular EF. Left ventricular ejection fraction appears to be 56 - 60%. Left ventricular systolic function is normal.  · The left ventricular cavity is mildly dilated.  · Left ventricular diastolic function is consistent with (grade I) impaired relaxation.  · Estimated right ventricular systolic pressure from tricuspid regurgitation is normal (<35 mmHg).  · Normal right ventricular cavity size, wall thickness, systolic function and septal motion noted.  · The aortic valve exhibits mild sclerosis.  · No evidence of pulmonary hypertension is present.  · There is no evidence of pericardial effusion.      I personally viewed and interpreted the patient's EKG/Telemetry/lab data.      ECG 12 Lead    Date/Time: 5/24/2022 12:17 PM  Performed by: Massiel Espinoza APRN  Authorized by: Massiel Espinoza APRN   Rhythm: sinus rhythm  Ectopy: unifocal PVCs  Rate: normal  BPM: 93  Conduction: right bundle branch block  Comments: Run of atrial tachycardia          Milan Real  reports  that he has been smoking cigarettes. He has a 55.00 pack-year smoking history. He has quit using smokeless tobacco.. I have educated him on the risk of diseases from using tobacco products such as cancer, COPD and heart disease.     I advised him to quit and he is not willing to quit.    I spent 3  minutes counseling the patient.      Assessment & Plan    1. Frequent PVCs  - Frequent PVCs noted on recent 3 day monitor from [facility]. 10% PVC burden.  Other monitors have been attempted but have not been able to collect any data.  I reviewed his prior ECGs which show a right bundle, left inferior axis.  This is consistent with a possible right coronary cusp or mitral annular location of his PVC.  On discussion with him it is very difficult to tell if he has any symptoms related to his PVCs.  Currently his ejection fraction has been completely normal.  I reviewed his monitor as above and he is about 10% PVC burden.  There is one possible episode of nonsustained ventricular tachycardia, but this may just have represented atrial tachycardia with aberrancy.  In the setting of no significant coronary disease and normal ejection fraction I do not think this requires further work-up at the current time.  We discussed different treatment options including medical therapy versus catheter ablation.  At the current time he does not seem to be overall symptomatic with these and he does not have any change in his ejection fraction.  Therefore we will proceed with monitoring for now and can readdress this in the future if his symptoms change.    2. Dizziness  - Reports dizziness, extreme fatigue, and activity intolerance x 3-4 years. Denies recent syncope with this.  I do not necessarily think any of this is related to his PVCs.    Follow Up:  No follow-ups on file.    Scribed for Mello Conner MD by Massiel MOSS. 5/24/2022  12:20 EDT

## 2022-05-24 ENCOUNTER — OFFICE VISIT (OUTPATIENT)
Dept: CARDIOLOGY | Facility: CLINIC | Age: 72
End: 2022-05-24

## 2022-05-24 VITALS
HEART RATE: 83 BPM | WEIGHT: 239 LBS | DIASTOLIC BLOOD PRESSURE: 82 MMHG | BODY MASS INDEX: 33.46 KG/M2 | OXYGEN SATURATION: 98 % | HEIGHT: 71 IN | SYSTOLIC BLOOD PRESSURE: 150 MMHG

## 2022-05-24 DIAGNOSIS — I49.3 FREQUENT PVCS: Primary | ICD-10-CM

## 2022-05-24 DIAGNOSIS — R42 DIZZINESS: ICD-10-CM

## 2022-05-24 PROCEDURE — 93000 ELECTROCARDIOGRAM COMPLETE: CPT | Performed by: NURSE PRACTITIONER

## 2022-05-24 PROCEDURE — 99214 OFFICE O/P EST MOD 30 MIN: CPT | Performed by: STUDENT IN AN ORGANIZED HEALTH CARE EDUCATION/TRAINING PROGRAM

## 2022-05-24 RX ORDER — ALBUTEROL SULFATE 90 UG/1
2 AEROSOL, METERED RESPIRATORY (INHALATION) EVERY 4 HOURS PRN
COMMUNITY

## 2022-06-14 NOTE — PROGRESS NOTES
Subjective:     Encounter Date:06/15/2022    Patient ID: Milan Real is a 71 y.o.  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      REFERRING PROVIDER: ISA Lindsay  PULMONOLOGIST: David Doshi MD.  ANGIOGRAPHER: Glynn Hubbard MD  SLEEP PHYSICIAN: David Doshi MD  ELECTROPHYSIOLOGIST: Mello Conner MD    Chief Complaint:   Chief Complaint   Patient presents with   • Shortness of breath on exertion     Problem List:  1. Shortness of breath on exertion  a. Remote stress test over 20 years ago; negative per patient-data deficit  b. Abnormal ECG with frequent PVCs in a pattern of bigeminy November 2020  c. CCS class III angina/NYHA class III dyspnea on exertion symptoms with abnormal electrocardiogram   d. Left heart catheterization 12/11/2020: LVEF 50-55%, mild nonflow limiting CAD  e. Echocardiogram 1/25/2021: LVEF 58%, LV mildly dilated, LV diastolic function consistent with grade 1 impaired relaxation, RVSP less than 35 mmHg, normal RV cavity size, wall thickness, systolic function and septal motion noted.  Aortic valve exhibits mild sclerosis  f. CCS class 0 angina/NYHA class II-III dyspnea on exertion symptoms in the setting of COPD, June 2022  2. Current tobacco use; 1 pack/day x 55 years, intolerant to nicotine patches and Chantix, cigarette cessation February 2022, using nicotine pouches  3. COPD with 2 L O2 NC hs  4. GERD  5. Moderate obesity: BMI 33.45 February 2022  6. At risk for sleep apnea with daytime sleepiness with apparent abnormal outpatient sleep oximetry and upcoming formal sleep study in [place] [facility] summer 2022  7. Right-sided sciatica  8. Dizziness/presyncope  9. History of colitis spring 2020  10. Frequent PVCs/LAFB on ECG January 2022  a. Subjective bradycardia in the 20's-30's on pulse oximeter at home  b. 3-day Holter monitor 1/26/2022-1/29/2022 [facility]:10% PVC burden, 6% PAC burden, no pauses or AV block, no atrial fibrillation or SVT.  Patient had a 10 beat run of nonsustained V. tach and a 5 beat run of SVT.  c. Patient wore multiple monitors was very minimal readings due to location and poor cell service, and no data was collected on either monitor, electrophysiology consultation with no recommendations for further work-up  d. Initiation of flecainide 75 mg twice daily June 2022  11. Surgical history: Hernia repair as a child      Allergies   Allergen Reactions   • Other Unknown (See Comments)     UNKNOWN \"QUIT SMOKING PATCH\".  AND \"IT SET ME ON FIRE\"       Current Outpatient Medications:   •  albuterol sulfate  (90 Base) MCG/ACT inhaler, Inhale 2 puffs Every 4 (Four) Hours As Needed for Wheezing., Disp: , Rfl:   •  Budeson-Glycopyrrol-Formoterol (Breztri Aerosphere) 160-9-4.8 MCG/ACT aerosol inhaler, Take 1 puff by mouth Daily., Disp: , Rfl:   •  cetirizine (zyrTEC) 10 MG tablet, Take 10 mg by mouth Daily., Disp: , Rfl:   •  ipratropium-albuterol (DUO-NEB) 0.5-2.5 mg/3 ml nebulizer, 4 (Four) Times a Day., Disp: , Rfl:   •  montelukast (SINGULAIR) 10 MG tablet, , Disp: , Rfl:   •  pantoprazole (PROTONIX) 40 MG EC tablet, Take 40 mg by mouth Daily., Disp: , Rfl:     History of Present Illness: Patient returns for scheduled 4-month follow up.  The patient continues to have some fatigue and dizziness.  He is unaware of any palpitations and denies any chest pain or syncopal episodes.  He always has shortness of breath and sometimes has more wheezing with his COPD.  He continues to smoke 1 pack/day.  He occasionally will have sputum production.  He has been using ZYN to try and help with tobacco cessation.  He saw a sleep physician (Dr. Doshi) in [place] and has an upcoming sleep study and PFTs summer 2022.  He was unable to tolerate a CPAP mask in the past.  He continues to have daytime sleepiness.  He has had no interim ER visits, " "hospitalizations, serious illnesses, or surgeries. He is accompanied to the office today by a friend, who confirms his history. He underwent recent evaluation with Dr. Conner, who feels that no electrophysiologic diagnostic or therapeutic intervention was needed.        ROS   Obtained and negative except as outlined in problem list and HPI.    Procedures       Objective:       Vitals:    06/15/22 1453 06/15/22 1457   BP: 161/81 105/78   BP Location: Right arm Right arm   Patient Position: Sitting Standing   Pulse: 82 62   SpO2: 97% 97%   Weight: 109 kg (241 lb)    Height: 180.3 cm (71\")    Recheck blood pressure right arm sitting was 130/60  Body mass index is 33.61 kg/m².  Last weight: 239 lbs    Vitals reviewed.   Constitutional:       Appearance: Well-developed.   Neck:      Thyroid: No thyromegaly.      Vascular: No carotid bruit or JVD.      Lymphadenopathy: No cervical adenopathy.   Pulmonary:      Effort: Pulmonary effort is normal.      Breath sounds: Normal breath sounds. No wheezing. No rhonchi. No rales.   Cardiovascular:      Frequent ectopic beats. Irregular rhythm.      Murmurs: There is a grade 1/6 systolic murmur at the LLSB.      No gallop. No S3 gallop.   Pulses:     Dorsalis pedis: 2+ bilaterally.     Posterior tibial: 2+ bilaterally.  Abdominal:      Palpations: Abdomen is soft. There is no abdominal mass.      Tenderness: There is no abdominal tenderness.   Musculoskeletal: Normal range of motion. Skin:     General: Skin is warm and dry.      Findings: No rash.      Comments: Mild tobacco smell   Neurological:      Mental Status: Alert and oriented to person, place, and time.           Lab Review:     No recent laboratory studies available for review today.    · Holter Monitor, 02/11/2022  · In place 01/26/2022-01/29/2022  · 3 days analyzed time after artifact removal  · Normal Sinus Rhythm 99.85%  · Event Summary  · Max HR: 125 BPM  · Min HR: 77 BPM  · Avg HR: 95 BPM  · No pt triggered " events  · PVC Detected: 40,023 (10%)  · PVC Couplets: 327  · No PVC triplets or runs >/=4  · PAC Detected: 24,727 (6%)  · PAC Couplets: 69  · PAC Triplets: 1  · PAC Runs >/=4: 2        Assessment:       Overall continued acceptable course with no new interim cardiopulmonary complaints with suboptimal functional status on limited activity. We will defer additional diagnostic or therapeutic intervention from a cardiac perspective at this time, other than an effort to suppress his ventricular ectopy.  We will start flecainide 75 mg twice daily to help suppress the patient's 10% PVC burden.  We also encouraged the patient to follow-up with his sleep physician for treatment of his sleep apnea.  He has an upcoming sleep study and PFTs summer 2022 with Dr. Doshi. I advised the patient of the risks of continuing to use tobacco, and I provided this patient with smoking cessation educational materials and discussed how to quit smoking and patient has expressed the willingness to quit.  Counseled patient for 3-5 minutes.     Diagnosis Plan   1. Shortness of breath on exertion  Follow-up with Dr. Doshi for PFTs   2. Frequent PVCs  Flecainide 75 mg twice daily, ECG in 5 days   3. Moderate obesity  Physical activity as tolerated, heart healthy diet   4. Current tobacco use  Encouraged tobacco cessation   5. Dizziness  Flecainide 75 mg twice daily   6. Hyperlipidemia LDL goal <100  No new labs to review   7. At risk for sleep apnea  He has an upcoming sleep study and PFTs summer 2022 with Dr. Doshi.          Plan:         1. Patient to continue current medications and close follow up with the above providers, and initiate flecainide 75 mg twice daily.  2. Tentative cardiology follow up in October 2022 in [place] office  or patient may return sooner PRN.  3. ECG in 5 days with his primary care physician; patient needs to have ECG faxed to us for review  4. Encouraged tobacco cessation  5. Encouraged patient to follow-up  with his sleep physician for treatment of his sleep apnea    Scribed for Ousmane King MD by Martita Garcia, ISA. 6/15/2022  15:29 EDT    I, Ousmane King MD, [facility], personally performed the services described in this documentation as scribed by the above named individual in my presence, and it is both accurate and complete. At 16:09 EDT on 06/15/2022

## 2022-06-15 ENCOUNTER — OFFICE VISIT (OUTPATIENT)
Dept: CARDIOLOGY | Facility: CLINIC | Age: 72
End: 2022-06-15

## 2022-06-15 VITALS
HEART RATE: 62 BPM | WEIGHT: 241 LBS | HEIGHT: 71 IN | DIASTOLIC BLOOD PRESSURE: 78 MMHG | SYSTOLIC BLOOD PRESSURE: 105 MMHG | OXYGEN SATURATION: 97 % | BODY MASS INDEX: 33.74 KG/M2

## 2022-06-15 DIAGNOSIS — E66.8 MODERATE OBESITY: ICD-10-CM

## 2022-06-15 DIAGNOSIS — R42 DIZZINESS: ICD-10-CM

## 2022-06-15 DIAGNOSIS — Z72.0 CURRENT TOBACCO USE: ICD-10-CM

## 2022-06-15 DIAGNOSIS — E78.5 HYPERLIPIDEMIA LDL GOAL <100: ICD-10-CM

## 2022-06-15 DIAGNOSIS — Z51.81 ENCOUNTER FOR MONITORING FLECAINIDE THERAPY: ICD-10-CM

## 2022-06-15 DIAGNOSIS — I49.3 FREQUENT PVCS: ICD-10-CM

## 2022-06-15 DIAGNOSIS — Z79.899 ENCOUNTER FOR MONITORING FLECAINIDE THERAPY: ICD-10-CM

## 2022-06-15 DIAGNOSIS — R06.02 SHORTNESS OF BREATH ON EXERTION: Primary | ICD-10-CM

## 2022-06-15 DIAGNOSIS — Z91.89 AT RISK FOR SLEEP APNEA: ICD-10-CM

## 2022-06-15 PROCEDURE — 99214 OFFICE O/P EST MOD 30 MIN: CPT | Performed by: INTERNAL MEDICINE

## 2022-06-15 RX ORDER — MONTELUKAST SODIUM 10 MG/1
10 TABLET ORAL NIGHTLY
COMMUNITY
Start: 2022-05-27

## 2022-06-15 RX ORDER — FLECAINIDE ACETATE 50 MG/1
75 TABLET ORAL 2 TIMES DAILY
Qty: 270 TABLET | Refills: 1 | Status: SHIPPED | OUTPATIENT
Start: 2022-06-15 | End: 2023-03-23

## 2022-07-22 ENCOUNTER — TELEPHONE (OUTPATIENT)
Dept: CARDIOLOGY | Facility: CLINIC | Age: 72
End: 2022-07-22

## 2022-07-22 NOTE — TELEPHONE ENCOUNTER
Patient called and stated that he has felt dizzy since starting flecainide 75 mg BID 6/15/22, and he HR was running in the high 100s. Patient states that from approx 7/12-7/18 his dizziness increased and he could hardly walk.     Patient decreased flecainide 30 mg from 1.5 tablets BID, to 1 tablet BID then 1 tablet daily, but cannot give specific timeline of dose decreases.     Currently patient feels pretty good, probably feels dizzy when he first takes flecainide but it improves after a couple hours. HR runs 55-67.    All HR readings taken with pulse oximeter.     Pt denies dizziness, chest pain, palpitations, swelling. Pt has SOB at baseline due to COPD, but it is increased.       Current cardiac meds:  Flecainide 50 mg daily         Please advise.

## 2022-07-22 NOTE — TELEPHONE ENCOUNTER
Noted; very perplexing.  Would just have him discontinue flecainide entirely and have him follow-up with electrophysiology service (Dr. Conner).    Thanks!

## 2022-07-22 NOTE — TELEPHONE ENCOUNTER
Called pt and gave KTS recommendations above. Pt verbalizes understanding and agreeable to plan.    Message sent to scheduling to have patient schedule with Dr. Conner.

## 2022-08-30 ENCOUNTER — OFFICE VISIT (OUTPATIENT)
Dept: CARDIOLOGY | Facility: CLINIC | Age: 72
End: 2022-08-30

## 2022-08-30 VITALS
OXYGEN SATURATION: 95 % | HEART RATE: 99 BPM | BODY MASS INDEX: 32.56 KG/M2 | DIASTOLIC BLOOD PRESSURE: 72 MMHG | SYSTOLIC BLOOD PRESSURE: 140 MMHG | WEIGHT: 232.6 LBS | HEIGHT: 71 IN

## 2022-08-30 DIAGNOSIS — I49.3 FREQUENT PVCS: Primary | ICD-10-CM

## 2022-08-30 PROCEDURE — 93000 ELECTROCARDIOGRAM COMPLETE: CPT | Performed by: STUDENT IN AN ORGANIZED HEALTH CARE EDUCATION/TRAINING PROGRAM

## 2022-08-30 PROCEDURE — 99214 OFFICE O/P EST MOD 30 MIN: CPT | Performed by: STUDENT IN AN ORGANIZED HEALTH CARE EDUCATION/TRAINING PROGRAM

## 2022-08-30 RX ORDER — GABAPENTIN 600 MG/1
600 TABLET ORAL DAILY
COMMUNITY
Start: 2022-08-26

## 2022-08-30 RX ORDER — BUSPIRONE HYDROCHLORIDE 10 MG/1
10 TABLET ORAL DAILY
COMMUNITY
Start: 2022-08-26 | End: 2023-03-23

## 2022-08-30 NOTE — PROGRESS NOTES
Cardiac Electrophysiology Outpatient Note  [facility]    Office Visit     Milan Real  [id number]  05/24/2022    Primary Care Physician: Anais Llamas APRN    Referred By: No ref. provider found    CC: PVCs, dizziness    Problem List:  1. PVCs  1. 3-day Holter monitor 1/26/2022-1/29/2022 [facility]: 10% PVC burden, 6% PAC burden, no pauses or AV block, no atrial fibrillation or SVT.  Patient had a 10 beat run of nonsustained V. tach and a 5 beat run of SVT.  2. Reportedly attempted MCOT and 14-day event monitor and no data collected on either monitor  2. Shortness of breath on exertion  a. Remote stress test over 20 years ago; negative per patient-data deficit  b. Abnormal ECG with frequent PVCs in a pattern of bigeminy November 2020  c. CCS class III angina/NYHA class III dyspnea on exertion symptoms with abnormal electrocardiogram   d. Left heart catheterization 12/11/2020: LVEF 50-55%, mild nonflow limiting CAD  e. Echocardiogram 1/25/2021: LVEF 58%, LV mildly dilated, LV diastolic function consistent with grade 1 impaired relaxation, RVSP less than 35 mmHg, normal RV cavity size, wall thickness, systolic function and septal motion noted.  Aortic valve exhibits mild sclerosis  f. CCS class 0 angina/NYHA class II-III dyspnea on exertion symptoms in the setting of COPD  3. Current tobacco use; 1 pack/day x 55 years, intolerant to nicotine patches and Chantix, cigarette cessation February 2022, using nicotine patches  4. COPD with 2 L O2 NC hs  5. GERD  6. Moderate obesity: BMI 33.45 February 2022  7. At risk for sleep apnea with daytime sleepiness with apparent abnormal outpatient sleep oximetry. Per pt, sleep study was postponed d/t pending heart workup, has not been rescheduled  8. Right-sided sciatica  9. Dizziness/presyncope  10. History of colitis spring 2020  11. LAFB on ECG January 2022, subjective bradycardia in the  20's-30's on pulse oximeter at home, has pending 4 day Holter monitor at [facility]  12. Medical noncompliance  13. Surgical history: Hernia repair as a child    History of Present Illness:   Milan Real is a 71 y.o. male who presents to the electrophysiology clinic for follow-up regarding PVCs.  We seen him previously and it was not clear he was symptomatic with his PVCs.  He also normal ejection fraction so we decided to do watchful waiting with this.  He did have some bradycardia with this and he was eventually started on flecainide by Dr. King.  He apparently had symptoms after starting it that were possible side effects of flecainide.  This mainly was dizziness.  Because of this decision was made to stop the flecainide.  He comes in today for follow-up of this.    He overall is feeling well.  His only main symptom is overall feeling fatigued and tired.  He denies any palpitations.  He does notice on his pulse oximeter sometimes his heart rate gets into the 30s but he does not clearly have any symptoms with this.  He denies any chest pain, orthopnea, PND, lower extremity swelling.  He does have chronic dyspnea related to his COPD.        Past Surgical History:   Procedure Laterality Date   • CARDIAC CATHETERIZATION N/A 12/11/2020    Procedure: Left Heart Cath;  Surgeon: Glynn Hubbard MD;  Location: [facility] CATH INVASIVE LOCATION;  Service: Cardiology;  Laterality: N/A;       Family History   Problem Relation Age of Onset   • Heart disease Father        Social History     Socioeconomic History   • Marital status:    Tobacco Use   • Smoking status: Current Every Day Smoker     Packs/day: 1.00     Years: 55.00     Pack years: 55.00     Types: Cigarettes   • Smokeless tobacco: Former User   Vaping Use   • Vaping Use: Never used   Substance and Sexual Activity   • Alcohol use: Not Currently     Comment: SOCIAL   • Drug use: No   • Sexual activity: Defer         Current  "Outpatient Medications:   •  albuterol sulfate  (90 Base) MCG/ACT inhaler, Inhale 2 puffs Every 4 (Four) Hours As Needed for Wheezing., Disp: , Rfl:   •  Budeson-Glycopyrrol-Formoterol (Breztri Aerosphere) 160-9-4.8 MCG/ACT aerosol inhaler, Take 1 puff by mouth Daily., Disp: , Rfl:   •  busPIRone (BUSPAR) 10 MG tablet, Take 10 mg by mouth Daily., Disp: , Rfl:   •  cetirizine (zyrTEC) 10 MG tablet, Take 10 mg by mouth Daily., Disp: , Rfl:   •  gabapentin (NEURONTIN) 600 MG tablet, Take 600 mg by mouth Daily., Disp: , Rfl:   •  ipratropium-albuterol (DUO-NEB) 0.5-2.5 mg/3 ml nebulizer, Take 1.5 mL by nebulization 4 (Four) Times a Day., Disp: , Rfl:   •  montelukast (SINGULAIR) 10 MG tablet, Take 10 mg by mouth Every Night., Disp: , Rfl:   •  O2 (OXYGEN), Inhale 4 L/min As Needed., Disp: , Rfl:   •  pantoprazole (PROTONIX) 40 MG EC tablet, Take 40 mg by mouth Daily., Disp: , Rfl:   •  flecainide (TAMBOCOR) 50 MG tablet, Take 1.5 tablets by mouth 2 (Two) Times a Day., Disp: 270 tablet, Rfl: 1    Allergies:   Allergies   Allergen Reactions   • Flecainide Other (See Comments)     Dizziness, SOA, Felt like he had \"flu symptoms\"   • Other Unknown (See Comments)     UNKNOWN \"QUIT SMOKING PATCH\".  AND \"IT SET ME ON FIRE\"        Vital Signs: Blood pressure 140/72, pulse 99, height 180.3 cm (71\"), weight 106 kg (232 lb 9.6 oz), SpO2 95 %.    Physical Exam  Vitals reviewed.   Cardiovascular:      Rate and Rhythm: Normal rate. Rhythm irregular.      Heart sounds: Normal heart sounds. No murmur heard.  Pulmonary:      Effort: Pulmonary effort is normal.      Comments: Decreased breath sounds  Musculoskeletal:      Right lower leg: No edema.      Left lower leg: No edema.   Neurological:      Mental Status: He is alert and oriented to person, place, and time.   Psychiatric:         Behavior: Behavior is cooperative.         Lab Results   Component Value Date    GLUCOSE 88 12/11/2020    CALCIUM 9.9 12/11/2020     " 12/11/2020    K 4.5 12/11/2020    CO2 27.0 12/11/2020     12/11/2020    BUN 11 12/11/2020    CREATININE 1.10 12/11/2020    EGFRIFNONA 66 12/11/2020    BCR 10.0 12/11/2020    ANIONGAP 11.0 12/11/2020     Lab Results   Component Value Date    WBC 9.21 12/11/2020    HGB 17.0 12/11/2020    HCT 52.2 (H) 12/11/2020    MCV 91.9 12/11/2020     12/11/2020     No results found for: INR, PROTIME  No results found for: TSH, E3IALAS, F3LNTSQ, THYROIDAB     Results for orders placed during the hospital encounter of 01/25/21    Adult Transthoracic Echo Complete W/ Cont if Necessary Per Protocol    Interpretation Summary  · Estimated left ventricular EF = 58% Estimated left ventricular EF was in agreement with the calculated left ventricular EF. Left ventricular ejection fraction appears to be 56 - 60%. Left ventricular systolic function is normal.  · The left ventricular cavity is mildly dilated.  · Left ventricular diastolic function is consistent with (grade I) impaired relaxation.  · Estimated right ventricular systolic pressure from tricuspid regurgitation is normal (<35 mmHg).  · Normal right ventricular cavity size, wall thickness, systolic function and septal motion noted.  · The aortic valve exhibits mild sclerosis.  · No evidence of pulmonary hypertension is present.  · There is no evidence of pericardial effusion.      I personally viewed and interpreted the patient's EKG/Telemetry/lab data.      ECG 12 Lead    Date/Time: 8/30/2022 1:33 PM  Performed by: Mello Conner MD  Authorized by: Mello Conner MD   Comparison: compared with previous ECG from 6/20/2022  Similar to previous ECG  Comments: Sinus rhythm with PACs and PVCs, bifascicular block          Milan Mas Farhan  reports that he has been smoking cigarettes. He has a 55.00 pack-year smoking history. He has quit using smokeless tobacco.    Assessment & Plan    1. Frequent PVCs  - Frequent PVCs noted on recent 3 day monitor from [facility]. 10% PVC burden.  EKG today shows a left bundle branch block, left inferior axis with a transition in V2.  This suggest a right coronary cusp origin for the PVC.  He was tried on flecainide but had significant symptoms with this.  Overall, I am still not sure he has any symptoms related to his PVCs.  After discussion of his options including additional antiarrhythmic medicine such as amiodarone versus catheter ablation, I continue to think that monitoring remains the best option.  I do not think class I agents are a good choice for him due to his bifascicular block on ECG today.    We will continue to monitor his PVCs.  If he develops significant symptoms related to this or evidence of a cardiomyopathy then we may need to consider more aggressive treatment.    2. Dizziness  - Reports dizziness, extreme fatigue, and activity intolerance x 3-4 years. Denies recent syncope with this.  I do not necessarily think any of this is related to his PVCs.    Follow Up:  Return in about 6 months (around 2/28/2023) for Recheck.

## 2023-03-20 NOTE — PROGRESS NOTES
Cardiac Electrophysiology Outpatient Note  [facility]    Office Visit     Milan Real  [id number]  03/23/2023    Primary Care Physician: Anais Llamas APRN    Referred By: No ref. provider found    Subjective     No chief complaint on file.    Problem List:    1. PVCs  1. 3-day Holter monitor 1/26/2022-1/29/2022 [facility]: 10% PVC burden, 6% PAC burden, no pauses or AV block, no atrial fibrillation or SVT.  Patient had a 10 beat run of nonsustained V. tach and a 5 beat run of SVT.  2. Reportedly attempted MCOT and 14-day event monitor and no data collected on either monitor  2. Shortness of breath on exertion  a. Remote stress test over 20 years ago; negative per patient-data deficit  b. Abnormal ECG with frequent PVCs in a pattern of bigeminy November 2020  c. CCS class III angina/NYHA class III dyspnea on exertion symptoms with abnormal electrocardiogram   d. Left heart catheterization 12/11/2020: LVEF 50-55%, mild nonflow limiting CAD  e. Echocardiogram 1/25/2021: LVEF 58%, LV mildly dilated, LV diastolic function consistent with grade 1 impaired relaxation, RVSP less than 35 mmHg, normal RV cavity size, wall thickness, systolic function and septal motion noted.  Aortic valve exhibits mild sclerosis  f. CCS class 0 angina/NYHA class II-III dyspnea on exertion symptoms in the setting of COPD  3. Current tobacco use; 1 pack/day x 55 years, intolerant to nicotine patches and Chantix, cigarette cessation February 2022, using nicotine patches  4. COPD with 2 L O2 NC hs  5. GERD  6. Moderate obesity: BMI 33.45 February 2022  7. At risk for sleep apnea with daytime sleepiness with apparent abnormal outpatient sleep oximetry. Per pt, sleep study was postponed d/t pending heart workup, has not been rescheduled  8. Right-sided sciatica  9. Dizziness/presyncope  10. History of colitis spring 2020  11. LAFB on ECG January 2022,  subjective bradycardia in the 20's-30's on pulse oximeter at home, has pending 4 day Holter monitor at [facility]  12. Medical noncompliance  13. Surgical history: Hernia repair as a child     History of Present Illness:   Milan Real is a 72 y.o. male who presents to my electrophysiology clinic for follow up of PVC's.  Since his last visit, he has done well from a cardiac standpoint.  He has not noticed any PVC's.   He denies chest pain, palpitations or edema.  He does note chronic LATHAM,but attributes this to his COPD.  He continues to smoke 1 ppd.  His blood pressure is elevated today in the office at 170/90.  He took it prior to coming at home and it was 132/89.  I rechecked it and it was 132/84.    Past Medical History:   Diagnosis Date   • COPD (chronic obstructive pulmonary disease) (HCC)    • Hypertension        Past Surgical History:   Procedure Laterality Date   • CARDIAC CATHETERIZATION N/A 12/11/2020    Procedure: Left Heart Cath;  Surgeon: Glynn Hubbard MD;  Location: [facility] CATH INVASIVE LOCATION;  Service: Cardiology;  Laterality: N/A;       Family History   Problem Relation Age of Onset   • Heart disease Father        Social History     Socioeconomic History   • Marital status:    Tobacco Use   • Smoking status: Every Day     Packs/day: 1.00     Years: 55.00     Pack years: 55.00     Types: Cigarettes   • Smokeless tobacco: Former   Vaping Use   • Vaping Use: Never used   Substance and Sexual Activity   • Alcohol use: Not Currently     Comment: SOCIAL   • Drug use: No   • Sexual activity: Defer         Current Outpatient Medications:   •  albuterol sulfate  (90 Base) MCG/ACT inhaler, Inhale 2 puffs Every 4 (Four) Hours As Needed for Wheezing., Disp: , Rfl:   •  Budeson-Glycopyrrol-Formoterol (Breztri Aerosphere) 160-9-4.8 MCG/ACT aerosol inhaler, Take 1 puff by mouth Daily., Disp: , Rfl:   •  cetirizine (zyrTEC) 10 MG tablet, Take 1 tablet by mouth  "Daily., Disp: , Rfl:   •  erythromycin (ROMYCIN) 5 MG/GM ophthalmic ointment, As Needed., Disp: , Rfl:   •  gabapentin (NEURONTIN) 600 MG tablet, Take 1 tablet by mouth Daily., Disp: , Rfl:   •  ipratropium-albuterol (DUO-NEB) 0.5-2.5 mg/3 ml nebulizer, Take 1.5 mL by nebulization 4 (Four) Times a Day., Disp: , Rfl:   •  montelukast (SINGULAIR) 10 MG tablet, Take 1 tablet by mouth Every Night., Disp: , Rfl:   •  O2 (OXYGEN), Inhale 4 L/min As Needed., Disp: , Rfl:   •  pantoprazole (PROTONIX) 40 MG EC tablet, Take 1 tablet by mouth Daily., Disp: , Rfl:   •  predniSONE (DELTASONE) 10 MG tablet, Take 1 tablet by mouth Daily., Disp: , Rfl:   •  valsartan (DIOVAN) 80 MG tablet, Take 1 tablet by mouth Daily., Disp: , Rfl:     Allergies:   Allergies   Allergen Reactions   • Flecainide Other (See Comments)     Dizziness, SOA, Felt like he had \"flu symptoms\"   • Other Unknown (See Comments)     UNKNOWN \"QUIT SMOKING PATCH\".  AND \"IT SET ME ON FIRE\"       Objective   Vital Signs: Blood pressure 132/84, pulse 99, height 179.1 cm (70.5\"), weight 109 kg (240 lb), SpO2 95 %.    PHYSICAL EXAM  General appearance: Awake, alert, cooperative  Head: Normocephalic, without obvious abnormality, atraumatic  Neck: No JVD  Lungs: Clear to ascultation bilaterally  Heart: Regular rate and rhythm, no murmurs, 2+ LE pulses, no lower extremity swelling  Skin: Skin color, turgor normal, no rashes or lesions  Neurologic: Grossly normal     Lab Results   Component Value Date    GLUCOSE 88 12/11/2020    CALCIUM 9.9 12/11/2020     12/11/2020    K 4.5 12/11/2020    CO2 27.0 12/11/2020     12/11/2020    BUN 11 12/11/2020    CREATININE 1.10 12/11/2020    EGFRIFNONA 66 12/11/2020    BCR 10.0 12/11/2020    ANIONGAP 11.0 12/11/2020     Lab Results   Component Value Date    WBC 9.21 12/11/2020    HGB 17.0 12/11/2020    HCT 52.2 (H) 12/11/2020    MCV 91.9 12/11/2020     12/11/2020     No results found for: INR, PROTIME  No results found " for: TSH, R2OCSGU, A8HEHBX, THYROIDAB       Results for orders placed during the hospital encounter of 01/25/21    Adult Transthoracic Echo Complete W/ Cont if Necessary Per Protocol    Interpretation Summary  · Estimated left ventricular EF = 58% Estimated left ventricular EF was in agreement with the calculated left ventricular EF. Left ventricular ejection fraction appears to be 56 - 60%. Left ventricular systolic function is normal.  · The left ventricular cavity is mildly dilated.  · Left ventricular diastolic function is consistent with (grade I) impaired relaxation.  · Estimated right ventricular systolic pressure from tricuspid regurgitation is normal (<35 mmHg).  · Normal right ventricular cavity size, wall thickness, systolic function and septal motion noted.  · The aortic valve exhibits mild sclerosis.  · No evidence of pulmonary hypertension is present.  · There is no evidence of pericardial effusion.     Results for orders placed during the hospital encounter of 12/11/20    Cardiac Catheterization/Vascular Study    Narrative  · The ejection fraction was 50-55%.  · Mild, nonflow limiting, coronary artery disease      Milan Mas Farhan  reports that he has been smoking cigarettes. He has a 55.00 pack-year smoking history. He has quit using smokeless tobacco..   Advance Care Planning   Advance Care Planning: ACP discussion was declined by the patient. Patient does not have an advance directive, declines further assistance.     Assessment & Plan     1. Frequent PVCs  - Frequent PVCs noted on 3 day monitor from [facility]. 10% PVC burden.  EKG here shows a left bundle branch block, left inferior axis with a transition in V2.  This suggest a right coronary cusp origin for the PVC.  He was tried on flecainide but had significant symptoms with this.  Overall, I am still not sure he has any symptoms related to his PVCs.  He is overall doing just about the same.  Therefore I would not recommend any  further treatment of his PVCs at the current time.    We will continue to monitor his PVCs.  If he develops significant symptoms related to this or evidence of a cardiomyopathy then we may need to consider more aggressive treatment.    2. Dizziness  - Reports dizziness, extreme fatigue, and activity intolerance x 3-4 years. Denies recent syncope with this.  I do not necessarily think any of this is related to his PVCs.    Follow Up:  Return in about 1 year (around 3/23/2024) for Recheck.    Scribed for Mello Conner MD by Dalila Waggoner RN. 3/23/2023  14:58 EDT  I, Mello Conner MD, personally performed the services described in this documentation as scribed by the above named individual in my presence, and it is both accurate and complete.  3/23/2023  14:58 EDT    Thank you for allowing me to participate in the care of your patient. Please do not hesitate to contact me with additional questions or concerns.      Mello Conner M.D.  Cardiac Electrophysiologist  [facility] / [facility]             [phone]

## 2023-03-23 ENCOUNTER — OFFICE VISIT (OUTPATIENT)
Dept: CARDIOLOGY | Facility: CLINIC | Age: 73
End: 2023-03-23
Payer: MEDICARE

## 2023-03-23 VITALS
OXYGEN SATURATION: 95 % | SYSTOLIC BLOOD PRESSURE: 132 MMHG | WEIGHT: 240 LBS | DIASTOLIC BLOOD PRESSURE: 84 MMHG | BODY MASS INDEX: 33.6 KG/M2 | HEIGHT: 71 IN | HEART RATE: 99 BPM

## 2023-03-23 DIAGNOSIS — Z72.0 CURRENT TOBACCO USE: ICD-10-CM

## 2023-03-23 DIAGNOSIS — I10 PRIMARY HYPERTENSION: ICD-10-CM

## 2023-03-23 DIAGNOSIS — I49.3 FREQUENT PVCS: Primary | ICD-10-CM

## 2023-03-23 PROBLEM — Z98.890 HISTORY OF CARDIAC CATHETERIZATION: Status: ACTIVE | Noted: 2021-10-07

## 2023-03-23 PROCEDURE — 99214 OFFICE O/P EST MOD 30 MIN: CPT | Performed by: STUDENT IN AN ORGANIZED HEALTH CARE EDUCATION/TRAINING PROGRAM

## 2023-03-23 RX ORDER — VALSARTAN 80 MG/1
80 TABLET ORAL DAILY
COMMUNITY

## 2023-03-23 RX ORDER — ERYTHROMYCIN 5 MG/G
OINTMENT OPHTHALMIC AS NEEDED
COMMUNITY
Start: 2023-03-14

## 2023-03-23 RX ORDER — PREDNISONE 10 MG/1
10 TABLET ORAL DAILY
COMMUNITY

## 2024-04-25 ENCOUNTER — OFFICE VISIT (OUTPATIENT)
Dept: CARDIOLOGY | Facility: CLINIC | Age: 74
End: 2024-04-25
Payer: MEDICARE

## 2024-04-25 VITALS
OXYGEN SATURATION: 97 % | WEIGHT: 214.6 LBS | BODY MASS INDEX: 30.04 KG/M2 | HEART RATE: 96 BPM | DIASTOLIC BLOOD PRESSURE: 68 MMHG | SYSTOLIC BLOOD PRESSURE: 142 MMHG | HEIGHT: 71 IN

## 2024-04-25 DIAGNOSIS — I10 ESSENTIAL HYPERTENSION: ICD-10-CM

## 2024-04-25 DIAGNOSIS — Z72.0 CURRENT TOBACCO USE: ICD-10-CM

## 2024-04-25 DIAGNOSIS — I49.3 FREQUENT PVCS: Primary | ICD-10-CM

## 2024-04-25 DIAGNOSIS — R53.83 OTHER FATIGUE: ICD-10-CM

## 2024-04-25 DIAGNOSIS — I25.10 NONOBSTRUCTIVE ATHEROSCLEROSIS OF CORONARY ARTERY: ICD-10-CM

## 2024-04-25 RX ORDER — ASPIRIN 81 MG/1
81 TABLET ORAL DAILY
Start: 2024-04-25

## 2024-04-25 NOTE — PROGRESS NOTES
Cardiac Electrophysiology Outpatient Note  [facility]    Office Visit     Milan Real  [id number]  04/25/2024    Primary Care Physician: Anais Llamas APRN    Referred By: No ref. provider found    Subjective     Chief Complaint   Patient presents with    Frequent PVCs     Problem List:     PVCs  3-day Holter monitor 1/26/2022-1/29/2022 [facility]: 10% PVC burden, 6% PAC burden, no pauses or AV block, no atrial fibrillation or SVT.  Patient had a 10 beat run of nonsustained V. tach and a 5 beat run of SVT.  Reportedly attempted MCOT and 14-day event monitor and no data collected on either monitor  Shortness of breath on exertion  Remote stress test over 20 years ago; negative per patient-data deficit  Abnormal ECG with frequent PVCs in a pattern of bigeminy November 2020  CCS class III angina/NYHA class III dyspnea on exertion symptoms with abnormal electrocardiogram   Left heart catheterization 12/11/2020: LVEF 50-55%, mild nonflow limiting CAD  Echocardiogram 1/25/2021: LVEF 58%, LV mildly dilated, LV diastolic function consistent with grade 1 impaired relaxation, RVSP less than 35 mmHg, normal RV cavity size, wall thickness, systolic function and septal motion noted.  Aortic valve exhibits mild sclerosis  CCS class 0 angina/NYHA class II-III dyspnea on exertion symptoms in the setting of COPD  Current tobacco use; 1 pack/day x 55 years, intolerant to nicotine patches and Chantix, cigarette cessation February 2022, using nicotine patches  COPD with 2 L O2 NC hs  GERD  Moderate obesity: BMI 33.45 February 2022  At risk for sleep apnea with daytime sleepiness with apparent abnormal outpatient sleep oximetry. Per pt, sleep study was postponed d/t pending heart workup, has not been rescheduled  Right-sided sciatica  Dizziness/presyncope  History of colitis spring 2020  LAFB on ECG January 2022, subjective bradycardia in the  20's-30's on pulse oximeter at home, has pending 4 day Holter monitor at [facility]  Medical noncompliance  Surgical history: Hernia repair as a child    History of Present Illness:   Milan Real is a 73 y.o. male who presents to my electrophysiology clinic for follow up of PVC's.  Patient was last seen in our office in March 2023.  We have followed him historically for frequent PVCs with 10% PVC burden in early 2022.  The patient was trialed with flecainide but he was unable to tolerate this.  He reports he was experiencing some shortness of breath with his baseline fatigue and continued cigarette use, but reports this is stable from previous years.  He does report significant fatigue that limits him throughout the day.  This has worsened significantly since we saw him last year.  He has been noted to be noncompliant with medications in the past.  He denies any chest pain, palpitations, edema or near/total syncopal episodes.      Past Medical History:   Diagnosis Date    COPD (chronic obstructive pulmonary disease)     Hypertension        Past Surgical History:   Procedure Laterality Date    CARDIAC CATHETERIZATION N/A 12/11/2020    Procedure: Left Heart Cath;  Surgeon: Glynn Hubbard MD;  Location: [facility] CATH INVASIVE LOCATION;  Service: Cardiology;  Laterality: N/A;       Family History   Problem Relation Age of Onset    Heart disease Father        Social History     Socioeconomic History    Marital status:    Tobacco Use    Smoking status: Every Day     Current packs/day: 1.00     Average packs/day: 1 pack/day for 55.0 years (55.0 ttl pk-yrs)     Types: Cigarettes    Smokeless tobacco: Former   Vaping Use    Vaping status: Never Used   Substance and Sexual Activity    Alcohol use: Not Currently     Comment: SOCIAL    Drug use: No    Sexual activity: Defer         Current Outpatient Medications:     albuterol sulfate  (90 Base) MCG/ACT inhaler, Inhale 2 puffs  "Every 4 (Four) Hours As Needed for Wheezing., Disp: , Rfl:     Budeson-Glycopyrrol-Formoterol (Breztri Aerosphere) 160-9-4.8 MCG/ACT aerosol inhaler, Take 1 puff by mouth Daily., Disp: , Rfl:     cetirizine (zyrTEC) 10 MG tablet, Take 1 tablet by mouth Daily., Disp: , Rfl:     gabapentin (NEURONTIN) 600 MG tablet, Take 1 tablet by mouth Daily., Disp: , Rfl:     ipratropium-albuterol (DUO-NEB) 0.5-2.5 mg/3 ml nebulizer, Take 1.5 mL by nebulization 4 (Four) Times a Day., Disp: , Rfl:     montelukast (SINGULAIR) 10 MG tablet, Take 1 tablet by mouth Every Night., Disp: , Rfl:     O2 (OXYGEN), Inhale 4 L/min As Needed., Disp: , Rfl:     aspirin 81 MG EC tablet, Take 1 tablet by mouth Daily., Disp: , Rfl:     metoprolol tartrate (LOPRESSOR) 25 MG tablet, Take 1 tablet by mouth 2 (Two) Times a Day., Disp: 120 tablet, Rfl: 5    Allergies:   Allergies   Allergen Reactions    Flecainide Other (See Comments)     Dizziness, SOA, Felt like he had \"flu symptoms\"    Other Unknown (See Comments)     UNKNOWN \"QUIT SMOKING PATCH\".  AND \"IT SET ME ON FIRE\"       Objective   Vital Signs: Blood pressure 142/68, pulse 96, height 180.3 cm (71\"), weight 97.3 kg (214 lb 9.6 oz), SpO2 97%.    PHYSICAL EXAM  General appearance: Awake, alert, cooperative  Head: Normocephalic, without obvious abnormality, atraumatic  Lungs: Diffuse mild expiratory wheeze and prolonged expiration.  Nonlabored breathing on room air however.  Heart: Regular rate and rhythm, no murmurs, 2+ LE pulses, no lower extremity swelling  Skin: Skin color, turgor normal, no rashes or lesions  Neurologic: Grossly normal     Lab Results   Component Value Date    GLUCOSE 88 12/11/2020    CALCIUM 9.9 12/11/2020     12/11/2020    K 4.5 12/11/2020    CO2 27.0 12/11/2020     12/11/2020    BUN 11 12/11/2020    CREATININE 1.10 12/11/2020    EGFRIFNONA 66 12/11/2020    BCR 10.0 12/11/2020    ANIONGAP 11.0 12/11/2020     Lab Results   Component Value Date    WBC 9.21 " "12/11/2020    HGB 17.0 12/11/2020    HCT 52.2 (H) 12/11/2020    MCV 91.9 12/11/2020     12/11/2020     No results found for: \"INR\", \"PROTIME\"  No results found for: \"TSH\", \"L2TJWAP\", \"M4RGDTJ\", \"THYROIDAB\"       Results for orders placed during the hospital encounter of 01/25/21    Adult Transthoracic Echo Complete W/ Cont if Necessary Per Protocol    Interpretation Summary  · Estimated left ventricular EF = 58% Estimated left ventricular EF was in agreement with the calculated left ventricular EF. Left ventricular ejection fraction appears to be 56 - 60%. Left ventricular systolic function is normal.  · The left ventricular cavity is mildly dilated.  · Left ventricular diastolic function is consistent with (grade I) impaired relaxation.  · Estimated right ventricular systolic pressure from tricuspid regurgitation is normal (<35 mmHg).  · Normal right ventricular cavity size, wall thickness, systolic function and septal motion noted.  · The aortic valve exhibits mild sclerosis.  · No evidence of pulmonary hypertension is present.  · There is no evidence of pericardial effusion.     Results for orders placed during the hospital encounter of 12/11/20    Cardiac Catheterization/Vascular Study    Conclusion  · The ejection fraction was 50-55%.  · Mild, nonflow limiting, coronary artery disease      I personally viewed and interpreted the patient's EKG/Telemetry/lab data      ECG 12 Lead    Date/Time: 4/25/2024 8:41 PM  Performed by: Nikhil Ochoa PA-C    Authorized by: Nikhil Ochoa PA-C  Comparison: compared with previous ECG from 8/30/2022  Similar to previous ECG  Rhythm: sinus rhythm  Ectopy: multifocal PVCs and atrial premature contractions  BPM: 100          Sharangino Chace Real  reports that he has been smoking cigarettes. He has a 55 pack-year smoking history. He has quit using smokeless tobacco. I have educated him on the risk of diseases from using tobacco products such as cancer, COPD, and " heart disease.     I advised him to quit and he is not willing to quit.    I spent 3  minutes counseling the patient.           Advance Care Planning   Advance Care Planning: ACP discussion was declined by the patient. Patient does not have an advance directive, information provided.     Assessment & Plan    1. Frequent PVCs  Patient with history of frequent PVCs.  Last Holter monitor in January 2000 2210% PVCs.  He has worn a Holter monitor since then but that failed to record any rhythm monitoring.  He notes that he has had increased fatigue over the last year that is significantly decreased his functional abilities.  No documented Holter monitor and echocardiogram.  - Holter Monitor - 48 Hour; Future - mailed to patient  - Adult Transthoracic Echo Complete W/ Cont if Necessary Per Protocol; Future    2. Essential hypertension  BP elevated at 142/63.  He was started on valsartan last year but did not start it because he did not want to add to his pill burden.  Will add metoprolol to tartrate 25 mg twice daily today as his heart rate was 100 bpm at rest today as well as to help with blood pressures.  Patient reports she intends on being compliant.    3. Nonobstructive atherosclerosis of coronary artery  Patient likely needs to be on a statin as well, but he has been noncompliant with medications in the past.  With ongoing tobacco use and historical left heart catheterization with nonobstructive CAD, patient is agreeable to start baby aspirin 81 mg daily.  - Adult Transthoracic Echo Complete W/ Cont if Necessary Per Protocol; Future    4. Other fatigue  Will document echocardiogram to rule out PVC induced cardiomyopathy or other structural/functional abnormalities that could be contributing to the patient's fatigue.  He is also short of breath at home, but does have COPD which is likely caused.  - Adult Transthoracic Echo Complete W/ Cont if Necessary Per Protocol; Future       Follow Up:  Return in about 6 months  (around 10/25/2024).      Thank you for allowing me to participate in the care of your patient. Please do not hesitate to contact me with additional questions or concerns.      Nikhil Ochoa PA-C  Cardiac Electrophysiology  [facility] / [facility]

## 2024-05-15 ENCOUNTER — TELEPHONE (OUTPATIENT)
Dept: CARDIOLOGY | Facility: CLINIC | Age: 74
End: 2024-05-15
Payer: MEDICARE

## 2024-05-15 NOTE — TELEPHONE ENCOUNTER
"Informed patient of echocardiogram results. He states he is still wearing holter and is to mail it in tomorrow. He also states he is having episodes of neuropathic pain that radiate all over his body and he is \"unsure where it is going to hit next\" and has been vomiting daily since starting the metoprolol.   "

## 2024-05-15 NOTE — TELEPHONE ENCOUNTER
Nikhil Ochoa PA-C Sullivan, Alexis, RN  Echo showed normal heart structure and function. The main thing I wanted to rule out was PVC-induced lowered ejection fraction. His EF/pump function was normal. Still waiting on results of his holter monitor             You can access the FollowMyHealth Patient Portal offered by [facility] by registering at the following website: [email or web address]. By joining [employer]’s FollowMyHealth portal, you will also be able to view your health information using other applications (apps) compatible with our system. You can access the FollowMyHealth Patient Portal offered by [facility] by registering at the following website: [email or web address]. By joining [employer]’s FollowMyHealth portal, you will also be able to view your health information using other applications (apps) compatible with our system.

## 2024-05-17 ENCOUNTER — TELEPHONE (OUTPATIENT)
Dept: CARDIOLOGY | Facility: CLINIC | Age: 74
End: 2024-05-17
Payer: MEDICARE

## 2024-05-17 NOTE — TELEPHONE ENCOUNTER
Patient called as he told our staff the metoprolol made him nauseous and he quit taking it. We will discontinue the metoprolol and see how his holter monitor looks. He is being evaluated by his PCP for unintentional weight loss of 20-30 lbs despite ample PO intake and no significant lifestyle changes. Otherwise his main complaint is fatigue. Echo recently done without significant abnormality to account for this. I will call patient when holter results are back.    Electronically signed by Nikhil Ochoa PA-C, 05/17/24, 1:23 PM EDT.

## (undated) DEVICE — MODEL BT2000 P/N 700287-012KIT CONTENTS: MANIFOLD WITH SALINE AND CONTRAST PORTS, SALINE TUBING WITH SPIKE AND HAND SYRINGE, TRANSDUCER: Brand: BT2000 AUTOMATED MANIFOLD KIT

## (undated) DEVICE — CATH DIAG EXPO .056 FL3.5 6F 100CM

## (undated) DEVICE — SKIN PREP TRAY W/CHG: Brand: MEDLINE INDUSTRIES, INC.

## (undated) DEVICE — GW INQWIRE FC PTFE STD J/1.5 .035 260

## (undated) DEVICE — PK CATH CARD 10

## (undated) DEVICE — CATH DIAG EXPO M/ PK 6FR FL4/FR4 PIG 3PK

## (undated) DEVICE — GLIDESHEATH 0.035" DILATOR HYDROPHILIC COATED INTRODUCER SHEATH: Brand: GLIDESHEATH

## (undated) DEVICE — RADIFOCUS GLIDEWIRE: Brand: GLIDEWIRE

## (undated) DEVICE — DEV COMP RAD PRELUDESYNC 24CM

## (undated) DEVICE — MODEL AT P65, P/N 701554-001KIT CONTENTS: HAND CONTROLLER, 3-WAY HIGH-PRESSURE STOPCOCK WITH ROTATING END AND PREMIUM HIGH-PRESSURE TUBING: Brand: ANGIOTOUCH® KIT